# Patient Record
Sex: MALE | Race: BLACK OR AFRICAN AMERICAN | NOT HISPANIC OR LATINO | Employment: OTHER | URBAN - METROPOLITAN AREA
[De-identification: names, ages, dates, MRNs, and addresses within clinical notes are randomized per-mention and may not be internally consistent; named-entity substitution may affect disease eponyms.]

---

## 2024-01-04 ENCOUNTER — HOSPITAL ENCOUNTER (INPATIENT)
Facility: OTHER | Age: 59
LOS: 6 days | Discharge: HOME | DRG: 482 | End: 2024-01-10
Attending: EMERGENCY MEDICINE | Admitting: HOSPITALIST
Payer: MEDICAID

## 2024-01-04 DIAGNOSIS — S72.002A CLOSED FRACTURE OF NECK OF LEFT FEMUR, INITIAL ENCOUNTER: ICD-10-CM

## 2024-01-04 DIAGNOSIS — B20 HUMAN IMMUNODEFICIENCY VIRUS (HIV) DISEASE: Primary | ICD-10-CM

## 2024-01-04 DIAGNOSIS — R07.9 CHEST PAIN: ICD-10-CM

## 2024-01-04 DIAGNOSIS — M25.559 HIP PAIN: ICD-10-CM

## 2024-01-04 PROBLEM — M85.859 OSTEOPENIA OF HIP: Status: ACTIVE | Noted: 2023-11-15

## 2024-01-04 PROBLEM — E78.5 HLD (HYPERLIPIDEMIA): Status: ACTIVE | Noted: 2017-12-19

## 2024-01-04 PROBLEM — Z22.7 LTBI (LATENT TUBERCULOSIS INFECTION): Status: ACTIVE | Noted: 2023-07-25

## 2024-01-04 PROBLEM — K70.30 ALCOHOLIC CIRRHOSIS OF LIVER WITHOUT ASCITES: Status: ACTIVE | Noted: 2021-01-29

## 2024-01-04 LAB
ALBUMIN SERPL BCP-MCNC: 3.1 G/DL (ref 3.5–5.2)
ALP SERPL-CCNC: 84 U/L (ref 55–135)
ALT SERPL W/O P-5'-P-CCNC: 34 U/L (ref 10–44)
ANION GAP SERPL CALC-SCNC: 13 MMOL/L (ref 8–16)
AST SERPL-CCNC: 99 U/L (ref 10–40)
BASOPHILS # BLD AUTO: 0.08 K/UL (ref 0–0.2)
BASOPHILS NFR BLD: 0.8 % (ref 0–1.9)
BILIRUB SERPL-MCNC: 0.4 MG/DL (ref 0.1–1)
BUN SERPL-MCNC: 5 MG/DL (ref 6–20)
CALCIUM SERPL-MCNC: 9.1 MG/DL (ref 8.7–10.5)
CHLORIDE SERPL-SCNC: 104 MMOL/L (ref 95–110)
CO2 SERPL-SCNC: 18 MMOL/L (ref 23–29)
CREAT SERPL-MCNC: 0.6 MG/DL (ref 0.5–1.4)
DIFFERENTIAL METHOD BLD: ABNORMAL
EOSINOPHIL # BLD AUTO: 0 K/UL (ref 0–0.5)
EOSINOPHIL NFR BLD: 0.1 % (ref 0–8)
ERYTHROCYTE [DISTWIDTH] IN BLOOD BY AUTOMATED COUNT: 15.5 % (ref 11.5–14.5)
EST. GFR  (NO RACE VARIABLE): >60 ML/MIN/1.73 M^2
ETHANOL SERPL-MCNC: 147 MG/DL
GLUCOSE SERPL-MCNC: 91 MG/DL (ref 70–110)
HCT VFR BLD AUTO: 33.9 % (ref 40–54)
HGB BLD-MCNC: 11.2 G/DL (ref 14–18)
IMM GRANULOCYTES # BLD AUTO: 0.03 K/UL (ref 0–0.04)
IMM GRANULOCYTES NFR BLD AUTO: 0.3 % (ref 0–0.5)
INR PPP: 1.1 (ref 0.8–1.2)
LYMPHOCYTES # BLD AUTO: 2 K/UL (ref 1–4.8)
LYMPHOCYTES NFR BLD: 20.6 % (ref 18–48)
MCH RBC QN AUTO: 32.6 PG (ref 27–31)
MCHC RBC AUTO-ENTMCNC: 33 G/DL (ref 32–36)
MCV RBC AUTO: 99 FL (ref 82–98)
MONOCYTES # BLD AUTO: 1.5 K/UL (ref 0.3–1)
MONOCYTES NFR BLD: 15.3 % (ref 4–15)
NEUTROPHILS # BLD AUTO: 6.1 K/UL (ref 1.8–7.7)
NEUTROPHILS NFR BLD: 62.9 % (ref 38–73)
NRBC BLD-RTO: 0 /100 WBC
PLATELET # BLD AUTO: 151 K/UL (ref 150–450)
PMV BLD AUTO: 10 FL (ref 9.2–12.9)
POTASSIUM SERPL-SCNC: 3.6 MMOL/L (ref 3.5–5.1)
PROT SERPL-MCNC: 10.1 G/DL (ref 6–8.4)
PROTHROMBIN TIME: 12.1 SEC (ref 9–12.5)
RBC # BLD AUTO: 3.44 M/UL (ref 4.6–6.2)
SODIUM SERPL-SCNC: 135 MMOL/L (ref 136–145)
WBC # BLD AUTO: 9.69 K/UL (ref 3.9–12.7)

## 2024-01-04 PROCEDURE — 82077 ASSAY SPEC XCP UR&BREATH IA: CPT | Performed by: PHYSICIAN ASSISTANT

## 2024-01-04 PROCEDURE — 63600175 PHARM REV CODE 636 W HCPCS: Performed by: EMERGENCY MEDICINE

## 2024-01-04 PROCEDURE — 85610 PROTHROMBIN TIME: CPT | Performed by: PHYSICIAN ASSISTANT

## 2024-01-04 PROCEDURE — 25000003 PHARM REV CODE 250: Performed by: ORTHOPAEDIC SURGERY

## 2024-01-04 PROCEDURE — 25000003 PHARM REV CODE 250: Performed by: PHYSICIAN ASSISTANT

## 2024-01-04 PROCEDURE — G0378 HOSPITAL OBSERVATION PER HR: HCPCS

## 2024-01-04 PROCEDURE — 99285 EMERGENCY DEPT VISIT HI MDM: CPT

## 2024-01-04 PROCEDURE — 80053 COMPREHEN METABOLIC PANEL: CPT | Performed by: EMERGENCY MEDICINE

## 2024-01-04 PROCEDURE — 85025 COMPLETE CBC W/AUTO DIFF WBC: CPT | Performed by: EMERGENCY MEDICINE

## 2024-01-04 PROCEDURE — 12000002 HC ACUTE/MED SURGE SEMI-PRIVATE ROOM

## 2024-01-04 RX ORDER — KETOCONAZOLE 20 MG/G
2 CREAM TOPICAL DAILY
COMMUNITY
Start: 2023-07-19

## 2024-01-04 RX ORDER — ATORVASTATIN CALCIUM 20 MG/1
40 TABLET, FILM COATED ORAL NIGHTLY
Status: DISCONTINUED | OUTPATIENT
Start: 2024-01-04 | End: 2024-01-10 | Stop reason: HOSPADM

## 2024-01-04 RX ORDER — FOLIC ACID 1 MG/1
1 TABLET ORAL DAILY
Status: DISCONTINUED | OUTPATIENT
Start: 2024-01-05 | End: 2024-01-10 | Stop reason: HOSPADM

## 2024-01-04 RX ORDER — IPRATROPIUM BROMIDE AND ALBUTEROL SULFATE 2.5; .5 MG/3ML; MG/3ML
3 SOLUTION RESPIRATORY (INHALATION) EVERY 4 HOURS PRN
Status: DISCONTINUED | OUTPATIENT
Start: 2024-01-04 | End: 2024-01-10 | Stop reason: HOSPADM

## 2024-01-04 RX ORDER — TALC
6 POWDER (GRAM) TOPICAL NIGHTLY PRN
Status: DISCONTINUED | OUTPATIENT
Start: 2024-01-04 | End: 2024-01-10 | Stop reason: HOSPADM

## 2024-01-04 RX ORDER — ATORVASTATIN CALCIUM 40 MG/1
40 TABLET, FILM COATED ORAL
COMMUNITY

## 2024-01-04 RX ORDER — KETOROLAC TROMETHAMINE 30 MG/ML
10 INJECTION, SOLUTION INTRAMUSCULAR; INTRAVENOUS
Status: COMPLETED | OUTPATIENT
Start: 2024-01-04 | End: 2024-01-04

## 2024-01-04 RX ORDER — ACETAMINOPHEN 500 MG
1000 TABLET ORAL EVERY 8 HOURS PRN
Status: DISCONTINUED | OUTPATIENT
Start: 2024-01-04 | End: 2024-01-10 | Stop reason: HOSPADM

## 2024-01-04 RX ORDER — IBUPROFEN 200 MG
24 TABLET ORAL
Status: DISCONTINUED | OUTPATIENT
Start: 2024-01-04 | End: 2024-01-10 | Stop reason: HOSPADM

## 2024-01-04 RX ORDER — ACETAMINOPHEN 325 MG/1
650 TABLET ORAL EVERY 4 HOURS PRN
Status: DISCONTINUED | OUTPATIENT
Start: 2024-01-04 | End: 2024-01-10 | Stop reason: HOSPADM

## 2024-01-04 RX ORDER — POLYETHYLENE GLYCOL 3350 17 G/17G
17 POWDER, FOR SOLUTION ORAL DAILY
Status: DISCONTINUED | OUTPATIENT
Start: 2024-01-05 | End: 2024-01-10 | Stop reason: HOSPADM

## 2024-01-04 RX ORDER — LANOLIN ALCOHOL/MO/W.PET/CERES
100 CREAM (GRAM) TOPICAL DAILY
Status: DISCONTINUED | OUTPATIENT
Start: 2024-01-05 | End: 2024-01-10 | Stop reason: HOSPADM

## 2024-01-04 RX ORDER — LANOLIN ALCOHOL/MO/W.PET/CERES
1 CREAM (GRAM) TOPICAL 2 TIMES DAILY
COMMUNITY

## 2024-01-04 RX ORDER — SIMETHICONE 80 MG
1 TABLET,CHEWABLE ORAL 4 TIMES DAILY PRN
Status: DISCONTINUED | OUTPATIENT
Start: 2024-01-04 | End: 2024-01-10 | Stop reason: HOSPADM

## 2024-01-04 RX ORDER — AMLODIPINE BESYLATE 5 MG/1
10 TABLET ORAL DAILY
Status: DISCONTINUED | OUTPATIENT
Start: 2024-01-05 | End: 2024-01-10 | Stop reason: HOSPADM

## 2024-01-04 RX ORDER — IBUPROFEN 200 MG
16 TABLET ORAL
Status: DISCONTINUED | OUTPATIENT
Start: 2024-01-04 | End: 2024-01-10 | Stop reason: HOSPADM

## 2024-01-04 RX ORDER — AMLODIPINE BESYLATE 10 MG/1
10 TABLET ORAL DAILY
COMMUNITY

## 2024-01-04 RX ORDER — HYDROCODONE BITARTRATE AND ACETAMINOPHEN 5; 325 MG/1; MG/1
1 TABLET ORAL EVERY 6 HOURS PRN
Status: DISCONTINUED | OUTPATIENT
Start: 2024-01-04 | End: 2024-01-05

## 2024-01-04 RX ORDER — DIAZEPAM 5 MG/1
5 TABLET ORAL EVERY 4 HOURS PRN
Status: DISCONTINUED | OUTPATIENT
Start: 2024-01-04 | End: 2024-01-10 | Stop reason: HOSPADM

## 2024-01-04 RX ORDER — NALOXONE HCL 0.4 MG/ML
0.02 VIAL (ML) INJECTION
Status: DISCONTINUED | OUTPATIENT
Start: 2024-01-04 | End: 2024-01-10 | Stop reason: HOSPADM

## 2024-01-04 RX ORDER — FACIAL-BODY WIPES
10 EACH TOPICAL DAILY PRN
Status: DISCONTINUED | OUTPATIENT
Start: 2024-01-04 | End: 2024-01-10 | Stop reason: HOSPADM

## 2024-01-04 RX ORDER — MAG HYDROX/ALUMINUM HYD/SIMETH 200-200-20
30 SUSPENSION, ORAL (FINAL DOSE FORM) ORAL 4 TIMES DAILY PRN
Status: DISCONTINUED | OUTPATIENT
Start: 2024-01-04 | End: 2024-01-10 | Stop reason: HOSPADM

## 2024-01-04 RX ORDER — SODIUM CHLORIDE 0.9 % (FLUSH) 0.9 %
5 SYRINGE (ML) INJECTION
Status: DISCONTINUED | OUTPATIENT
Start: 2024-01-04 | End: 2024-01-10 | Stop reason: HOSPADM

## 2024-01-04 RX ORDER — EMTRICITABINE, RILPIVIRINE HYDROCHLORIDE, AND TENOFOVIR ALAFENAMIDE 200; 25; 25 MG/1; MG/1; MG/1
1 TABLET ORAL EVERY MORNING
COMMUNITY

## 2024-01-04 RX ORDER — PROCHLORPERAZINE EDISYLATE 5 MG/ML
5 INJECTION INTRAMUSCULAR; INTRAVENOUS EVERY 6 HOURS PRN
Status: DISCONTINUED | OUTPATIENT
Start: 2024-01-04 | End: 2024-01-10 | Stop reason: HOSPADM

## 2024-01-04 RX ORDER — ONDANSETRON 8 MG/1
8 TABLET, ORALLY DISINTEGRATING ORAL EVERY 8 HOURS PRN
Status: DISCONTINUED | OUTPATIENT
Start: 2024-01-04 | End: 2024-01-10 | Stop reason: HOSPADM

## 2024-01-04 RX ORDER — GLUCAGON 1 MG
1 KIT INJECTION
Status: DISCONTINUED | OUTPATIENT
Start: 2024-01-04 | End: 2024-01-10 | Stop reason: HOSPADM

## 2024-01-04 RX ADMIN — HYDROCODONE BITARTRATE AND ACETAMINOPHEN 1 TABLET: 5; 325 TABLET ORAL at 08:01

## 2024-01-04 RX ADMIN — KETOROLAC TROMETHAMINE 10 MG: 30 INJECTION, SOLUTION INTRAMUSCULAR; INTRAVENOUS at 05:01

## 2024-01-04 RX ADMIN — ATORVASTATIN CALCIUM 40 MG: 20 TABLET, FILM COATED ORAL at 08:01

## 2024-01-04 NOTE — FIRST PROVIDER EVALUATION
Emergency Department TeleTriage Encounter Note      CHIEF COMPLAINT    Chief Complaint   Patient presents with    Fall     Reports fall 30 mins ago, complaining of severe L hip pain. Unable to walk in triage. States he does not know how he fell. + ETOH. Denies hitting head, denies syncope.       VITAL SIGNS   Initial Vitals [01/04/24 1540]   BP Pulse Resp Temp SpO2   (!) 146/84 81 20 -- 99 %      MAP       --            ALLERGIES    Review of patient's allergies indicates:  No Known Allergies    PROVIDER TRIAGE NOTE  Patient presents with complaint of left hip pain after fall.  Denies hitting his head.      Phy:   Constitutional: well nourished, well developed, appearing stated age, NAD        Initial orders will be placed and care will be transferred to an alternate provider when patient is roomed for a full evaluation. Any additional orders and the final disposition will be determined by that provider.        ORDERS  Labs Reviewed - No data to display    ED Orders (720h ago, onward)      None              Virtual Visit Note: The provider triage portion of this emergency department evaluation and documentation was performed via [employer], a HIPAA-compliant telemedicine application, in concert with a tele-presenter in the room. A face to face patient evaluation with one of my colleagues will occur once the patient is placed in an emergency department room.      DISCLAIMER: This note was prepared with [employer] voice recognition transcription software. Garbled syntax, mangled pronouns, and other bizarre constructions may be attributed to that software system.

## 2024-01-04 NOTE — ED TRIAGE NOTES
Patient presents to ED with c/o L hip pain after a fall while walking. He reports feeling his hip give out. Denies head injury. Reports decreased ROM and mobility due to pain.

## 2024-01-04 NOTE — ASSESSMENT & PLAN NOTE
Left hip xray shows acute subcapital versus transcervical left femoral neck fracture  Orthopedics consulted in ER  Will keep NPO @ MN and hold pharmacological VTE prophylaxis in anticipation for surgery tomorrow AM  Pain control with tylenol, norco  PTOT to be consulted after Ortho eval

## 2024-01-04 NOTE — ED PROVIDER NOTES
Encounter Date: 1/4/2024    SCRIBE #1 NOTE: IDario, am scribing for, and in the presence of,  Chacho Chairez II, MD. I have scribed the following portions of the note - Other sections scribed: HPI, ROS, PE.       History     Chief Complaint   Patient presents with    Fall     Reports fall 30 mins ago, complaining of severe L hip pain. Unable to walk in triage. States he does not know how he fell. + ETOH. Denies hitting head, denies syncope.     Time seen by provider: 4:25 PM    This is a 58 y.o. male who presents with complaint of a left hip injury 5-10 minutes ago. Patient was walking down the sidewalk when his leg suddenly gave out, causing him to fall onto the concrete without resulting head injury or LOC. When EMS arrived, he realized he was unable to move his leg. PMHx of HIV with good medication compliance and recent CD4 count within normal limits. SHx of daily tobacco smoking as well as occasional alcohol and marijuana use, however no further illicit drug use. This is the extent of the patient's complaints at this time.    The history is provided by the patient.     Review of patient's allergies indicates:   Allergen Reactions    Lisinopril Other (See Comments)     Angioedema^    Angioedema^   Angioedema^     History reviewed. No pertinent past medical history.  History reviewed. No pertinent surgical history.  History reviewed. No pertinent family history.  Social History     Tobacco Use    Smoking status: Every Day     Current packs/day: 0.50     Types: Cigarettes    Smokeless tobacco: Never   Substance Use Topics    Alcohol use: Not Currently    Drug use: Yes     Types: Marijuana     Review of Systems  See HPI     Physical Exam     Initial Vitals [01/04/24 1540]   BP Pulse Resp Temp SpO2   (!) 146/84 81 20 -- 99 %      MAP       --         Physical Exam    Nursing note and vitals reviewed.  Constitutional: He appears well-developed and well-nourished.   Odor of alcohol.    HENT:   Head:  Normocephalic.   No craniofacial trauma.    Eyes: Conjunctivae are normal.   Neck: Neck supple.   Musculoskeletal:         General: No edema.      Cervical back: Neck supple.      Comments: Tenderness over left lateral hip. Pain limits ROM. Unable to bear weight. Intact distal strength and sensation. Chronic 1+ pitting edema to the bilateral lower extremities.     Neurological: He is alert and oriented to person, place, and time.   Skin: Skin is warm and dry.   Psychiatric: He has a normal mood and affect.         ED Course   Procedures  Labs Reviewed   CBC W/ AUTO DIFFERENTIAL - Abnormal; Notable for the following components:       Result Value    RBC 3.44 (*)     Hemoglobin 11.2 (*)     Hematocrit 33.9 (*)     MCV 99 (*)     MCH 32.6 (*)     RDW 15.5 (*)     Mono # 1.5 (*)     Mono % 15.3 (*)     All other components within normal limits   COMPREHENSIVE METABOLIC PANEL - Abnormal; Notable for the following components:    Sodium 135 (*)     CO2 18 (*)     BUN 5 (*)     Total Protein 10.1 (*)     Albumin 3.1 (*)     AST 99 (*)     All other components within normal limits   PROTIME-INR   TOXICOLOGY SCREEN, URINE, RANDOM (COMPLIANCE)   ALCOHOL,MEDICAL (ETHANOL)          Imaging Results              X-Ray Hip 2 or 3 views Left (with Pelvis when performed) (Final result)  Result time 01/04/24 16:28:51      Final result by Major Rodriguez MD (01/04/24 16:28:51)                   Impression:      Acute subcapital versus transcervical left femoral neck fracture.      Electronically signed by: Major Rodriguez MD  Date:    01/04/2024  Time:    16:28               Narrative:    EXAMINATION:  XR HIP WITH PELVIS WHEN PERFORMED, 2 OR 3 VIEWS LEFT    CLINICAL HISTORY:  Pain in unspecified hip    TECHNIQUE:  AP view of the pelvis and frog leg lateral view of the left hip were performed.    COMPARISON:  None    FINDINGS:  There is a linear bony lucency at the subcapital left femoral neck most consistent with an acute left femoral  neck fracture.  The remainder of the bones appear intact.  There is no evidence for dislocation.  Sacroiliac joints are unremarkable.  Soft tissues appear grossly unremarkable.                                    X-Rays:   Independently Interpreted Readings:   Other Readings:  On x-ray of the left hip: nondisplaced left femoral neck.    Medications   amLODIPine tablet 10 mg (has no administration in time range)   atorvastatin tablet 40 mg (has no administration in time range)   sodium chloride 0.9% flush 5 mL (has no administration in time range)   albuterol-ipratropium 2.5 mg-0.5 mg/3 mL nebulizer solution 3 mL (has no administration in time range)   melatonin tablet 6 mg (has no administration in time range)   ondansetron disintegrating tablet 8 mg (has no administration in time range)   prochlorperazine injection Soln 5 mg (has no administration in time range)   polyethylene glycol packet 17 g (has no administration in time range)   bisacodyL suppository 10 mg (has no administration in time range)   simethicone chewable tablet 80 mg (has no administration in time range)   aluminum-magnesium hydroxide-simethicone 200-200-20 mg/5 mL suspension 30 mL (has no administration in time range)   acetaminophen tablet 650 mg (has no administration in time range)   acetaminophen tablet 1,000 mg (has no administration in time range)   naloxone 0.4 mg/mL injection 0.02 mg (has no administration in time range)   glucose chewable tablet 16 g (has no administration in time range)   glucose chewable tablet 24 g (has no administration in time range)   glucagon (human recombinant) injection 1 mg (has no administration in time range)   dextrose 10% bolus 125 mL 125 mL (has no administration in time range)   dextrose 10% bolus 250 mL 250 mL (has no administration in time range)   diazePAM tablet 5 mg (has no administration in time range)   multivitamin tablet (has no administration in time range)   folic acid tablet 1 mg (has no  administration in time range)   thiamine tablet 100 mg (has no administration in time range)   emtricitabine-tenofovir alafen 200-25 mg Tab 1 tablet (has no administration in time range)   rilpivirine HCl Tab 25 mg (has no administration in time range)   HYDROcodone-acetaminophen 5-325 mg per tablet 1 tablet (has no administration in time range)   ketorolac injection 9.999 mg (9.999 mg Intravenous Given 1/4/24 8084)     Medical Decision Making  Amount and/or Complexity of Data Reviewed  External Data Reviewed: notes.  Labs: ordered.  Radiology: ordered and independent interpretation performed.     Details: See interpretation above.     Risk  Prescription drug management.  Decision regarding hospitalization.    Patient presents after ground level fall with left hip pain, inability to bear weight.  X-ray does show nondisplaced fracture at the neck.  Consulted on-call orthopedics, Dr. Carpenter.  Will admit to the hospitalist service.  No other injuries or acute concerns.        Scribe Attestation:   Scribe #1: I performed the above scribed service and the documentation accurately describes the services I performed. I attest to the accuracy of the note.    Physician Attestation for Scribe: I, [facility], reviewed documentation as scribed in my presence, which is both accurate and complete.        ED Course as of 01/04/24 1827   Thu Jan 04, 2024 1656 Discussed case with orthopedics. Agrees with plan and will consult. [GS]      ED Course User Index  [GS] Dario Abarca                           Clinical Impression:  Final diagnoses:  [M25.559] Hip pain          ED Disposition Condition    Observation                 Chacho Chairez II, MD  01/04/24 6419

## 2024-01-04 NOTE — ASSESSMENT & PLAN NOTE
Drinks about 24 beers per day, last drink afternoon of admission  UDS and EtOH pending  Start folic acid, thiamine, MVI  CIWA q8H, seizure precautions  Valium PRN  Denies history of alcohol withdrawals

## 2024-01-04 NOTE — PHARMACY MED REC
"Admission Medication History     The home medication history was taken by Emili Arellano CPHT      You may go to "Admission" then "Reconcile Home Medications" tabs to review and/or act upon these items.     Patient was able to verbally verify medication and compliance.     "

## 2024-01-04 NOTE — ASSESSMENT & PLAN NOTE
No recent CD4 count, will add on for AM labs  Continue home lfoefeybye-fhumenue-wtqpzv ala 200-25-25 mg Tab 1 tablet daily (not on formulary, will have to order separately or have someone bring in meds from home)

## 2024-01-05 ENCOUNTER — ANESTHESIA EVENT (OUTPATIENT)
Dept: SURGERY | Facility: OTHER | Age: 59
DRG: 482 | End: 2024-01-05
Payer: MEDICAID

## 2024-01-05 ENCOUNTER — ANESTHESIA (OUTPATIENT)
Dept: SURGERY | Facility: OTHER | Age: 59
DRG: 482 | End: 2024-01-05
Payer: MEDICAID

## 2024-01-05 LAB
AMPHET+METHAMPHET UR QL: NEGATIVE
BARBITURATES UR QL SCN>200 NG/ML: NEGATIVE
BENZODIAZ UR QL SCN>200 NG/ML: NEGATIVE
BZE UR QL SCN: NEGATIVE
CANNABINOIDS UR QL SCN: ABNORMAL
CREAT UR-MCNC: 102.7 MG/DL (ref 23–375)
ETHANOL UR-MCNC: 78 MG/DL
METHADONE UR QL SCN>300 NG/ML: NEGATIVE
OPIATES UR QL SCN: ABNORMAL
PCP UR QL SCN>25 NG/ML: NEGATIVE
TOXICOLOGY INFORMATION: ABNORMAL

## 2024-01-05 PROCEDURE — 64450 NJX AA&/STRD OTHER PN/BRANCH: CPT | Mod: 59,LT,, | Performed by: ANESTHESIOLOGY

## 2024-01-05 PROCEDURE — 25000003 PHARM REV CODE 250: Performed by: NURSE ANESTHETIST, CERTIFIED REGISTERED

## 2024-01-05 PROCEDURE — 36000711: Performed by: ORTHOPAEDIC SURGERY

## 2024-01-05 PROCEDURE — 25000003 PHARM REV CODE 250: Performed by: HOSPITALIST

## 2024-01-05 PROCEDURE — 25000003 PHARM REV CODE 250: Performed by: ORTHOPAEDIC SURGERY

## 2024-01-05 PROCEDURE — 63600175 PHARM REV CODE 636 W HCPCS: Performed by: ANESTHESIOLOGY

## 2024-01-05 PROCEDURE — D9220A PRA ANESTHESIA: Mod: CRNA,,, | Performed by: NURSE ANESTHETIST, CERTIFIED REGISTERED

## 2024-01-05 PROCEDURE — 71000039 HC RECOVERY, EACH ADD'L HOUR: Performed by: ORTHOPAEDIC SURGERY

## 2024-01-05 PROCEDURE — 63600175 PHARM REV CODE 636 W HCPCS

## 2024-01-05 PROCEDURE — C1713 ANCHOR/SCREW BN/BN,TIS/BN: HCPCS | Performed by: ORTHOPAEDIC SURGERY

## 2024-01-05 PROCEDURE — 87536 HIV-1 QUANT&REVRSE TRNSCRPJ: CPT | Performed by: PHYSICIAN ASSISTANT

## 2024-01-05 PROCEDURE — 71000033 HC RECOVERY, INTIAL HOUR: Performed by: ORTHOPAEDIC SURGERY

## 2024-01-05 PROCEDURE — 11000001 HC ACUTE MED/SURG PRIVATE ROOM

## 2024-01-05 PROCEDURE — 37000008 HC ANESTHESIA 1ST 15 MINUTES: Performed by: ORTHOPAEDIC SURGERY

## 2024-01-05 PROCEDURE — 63600175 PHARM REV CODE 636 W HCPCS: Performed by: NURSE ANESTHETIST, CERTIFIED REGISTERED

## 2024-01-05 PROCEDURE — 63600175 PHARM REV CODE 636 W HCPCS: Performed by: NURSE PRACTITIONER

## 2024-01-05 PROCEDURE — 25000003 PHARM REV CODE 250: Performed by: PHYSICIAN ASSISTANT

## 2024-01-05 PROCEDURE — 86361 T CELL ABSOLUTE COUNT: CPT | Performed by: PHYSICIAN ASSISTANT

## 2024-01-05 PROCEDURE — 80307 DRUG TEST PRSMV CHEM ANLYZR: CPT | Performed by: PHYSICIAN ASSISTANT

## 2024-01-05 PROCEDURE — P9045 ALBUMIN (HUMAN), 5%, 250 ML: HCPCS | Mod: JZ,JG | Performed by: NURSE ANESTHETIST, CERTIFIED REGISTERED

## 2024-01-05 PROCEDURE — 36415 COLL VENOUS BLD VENIPUNCTURE: CPT | Performed by: PHYSICIAN ASSISTANT

## 2024-01-05 PROCEDURE — 37000009 HC ANESTHESIA EA ADD 15 MINS: Performed by: ORTHOPAEDIC SURGERY

## 2024-01-05 PROCEDURE — D9220A PRA ANESTHESIA: Mod: ANES,,, | Performed by: ANESTHESIOLOGY

## 2024-01-05 PROCEDURE — 64450 NJX AA&/STRD OTHER PN/BRANCH: CPT | Performed by: ANESTHESIOLOGY

## 2024-01-05 PROCEDURE — 36000710: Performed by: ORTHOPAEDIC SURGERY

## 2024-01-05 PROCEDURE — 63600175 PHARM REV CODE 636 W HCPCS: Performed by: ORTHOPAEDIC SURGERY

## 2024-01-05 PROCEDURE — 76942 ECHO GUIDE FOR BIOPSY: CPT | Mod: 26,,, | Performed by: ANESTHESIOLOGY

## 2024-01-05 PROCEDURE — 0QS734Z REPOSITION LEFT UPPER FEMUR WITH INTERNAL FIXATION DEVICE, PERCUTANEOUS APPROACH: ICD-10-PCS | Performed by: ORTHOPAEDIC SURGERY

## 2024-01-05 PROCEDURE — 25000003 PHARM REV CODE 250: Performed by: ANESTHESIOLOGY

## 2024-01-05 PROCEDURE — 94761 N-INVAS EAR/PLS OXIMETRY MLT: CPT

## 2024-01-05 PROCEDURE — 27201423 OPTIME MED/SURG SUP & DEVICES STERILE SUPPLY: Performed by: ORTHOPAEDIC SURGERY

## 2024-01-05 PROCEDURE — C1769 GUIDE WIRE: HCPCS | Performed by: ORTHOPAEDIC SURGERY

## 2024-01-05 DEVICE — IMPLANTABLE DEVICE: Type: IMPLANTABLE DEVICE | Site: HIP | Status: FUNCTIONAL

## 2024-01-05 DEVICE — PLATE FNS 1 HOLE TI STRL: Type: IMPLANTABLE DEVICE | Site: HIP | Status: FUNCTIONAL

## 2024-01-05 RX ORDER — OXYCODONE HYDROCHLORIDE 5 MG/1
5 TABLET ORAL
Status: DISCONTINUED | OUTPATIENT
Start: 2024-01-05 | End: 2024-01-05

## 2024-01-05 RX ORDER — DEXAMETHASONE SODIUM PHOSPHATE 4 MG/ML
INJECTION, SOLUTION INTRA-ARTICULAR; INTRALESIONAL; INTRAMUSCULAR; INTRAVENOUS; SOFT TISSUE
Status: DISCONTINUED | OUTPATIENT
Start: 2024-01-05 | End: 2024-01-05

## 2024-01-05 RX ORDER — LIDOCAINE HYDROCHLORIDE 20 MG/ML
INJECTION INTRAVENOUS
Status: DISCONTINUED | OUTPATIENT
Start: 2024-01-05 | End: 2024-01-05

## 2024-01-05 RX ORDER — DIPHENHYDRAMINE HYDROCHLORIDE 50 MG/ML
25 INJECTION, SOLUTION INTRAMUSCULAR; INTRAVENOUS EVERY 6 HOURS PRN
Status: DISCONTINUED | OUTPATIENT
Start: 2024-01-05 | End: 2024-01-05 | Stop reason: HOSPADM

## 2024-01-05 RX ORDER — TRAMADOL HYDROCHLORIDE 50 MG/1
50 TABLET ORAL EVERY 4 HOURS PRN
Status: DISCONTINUED | OUTPATIENT
Start: 2024-01-05 | End: 2024-01-10 | Stop reason: HOSPADM

## 2024-01-05 RX ORDER — ROCURONIUM BROMIDE 10 MG/ML
INJECTION, SOLUTION INTRAVENOUS
Status: DISCONTINUED | OUTPATIENT
Start: 2024-01-05 | End: 2024-01-05

## 2024-01-05 RX ORDER — FENTANYL CITRATE 50 UG/ML
INJECTION, SOLUTION INTRAMUSCULAR; INTRAVENOUS
Status: DISCONTINUED | OUTPATIENT
Start: 2024-01-05 | End: 2024-01-05

## 2024-01-05 RX ORDER — PHENYLEPHRINE HYDROCHLORIDE 10 MG/ML
INJECTION INTRAVENOUS
Status: DISCONTINUED | OUTPATIENT
Start: 2024-01-05 | End: 2024-01-05

## 2024-01-05 RX ORDER — ROPIVACAINE HYDROCHLORIDE 5 MG/ML
INJECTION, SOLUTION EPIDURAL; INFILTRATION; PERINEURAL
Status: COMPLETED | OUTPATIENT
Start: 2024-01-05 | End: 2024-01-05

## 2024-01-05 RX ORDER — HYDROCODONE BITARTRATE AND ACETAMINOPHEN 5; 325 MG/1; MG/1
1 TABLET ORAL EVERY 6 HOURS PRN
Status: DISCONTINUED | OUTPATIENT
Start: 2024-01-05 | End: 2024-01-05

## 2024-01-05 RX ORDER — HYDROMORPHONE HYDROCHLORIDE 2 MG/ML
0.4 INJECTION, SOLUTION INTRAMUSCULAR; INTRAVENOUS; SUBCUTANEOUS EVERY 5 MIN PRN
Status: DISCONTINUED | OUTPATIENT
Start: 2024-01-05 | End: 2024-01-05 | Stop reason: HOSPADM

## 2024-01-05 RX ORDER — CEFAZOLIN 2 G/1
INJECTION, POWDER, FOR SOLUTION INTRAMUSCULAR; INTRAVENOUS
Status: DISCONTINUED | OUTPATIENT
Start: 2024-01-05 | End: 2024-01-05

## 2024-01-05 RX ORDER — PROCHLORPERAZINE EDISYLATE 5 MG/ML
5 INJECTION INTRAMUSCULAR; INTRAVENOUS EVERY 30 MIN PRN
Status: DISCONTINUED | OUTPATIENT
Start: 2024-01-05 | End: 2024-01-05

## 2024-01-05 RX ORDER — HYDROCODONE BITARTRATE AND ACETAMINOPHEN 5; 325 MG/1; MG/1
1 TABLET ORAL EVERY 4 HOURS PRN
Status: DISCONTINUED | OUTPATIENT
Start: 2024-01-05 | End: 2024-01-10 | Stop reason: HOSPADM

## 2024-01-05 RX ORDER — MEPERIDINE HYDROCHLORIDE 25 MG/ML
12.5 INJECTION INTRAMUSCULAR; INTRAVENOUS; SUBCUTANEOUS ONCE AS NEEDED
Status: DISCONTINUED | OUTPATIENT
Start: 2024-01-05 | End: 2024-01-05

## 2024-01-05 RX ORDER — PROCHLORPERAZINE EDISYLATE 5 MG/ML
5 INJECTION INTRAMUSCULAR; INTRAVENOUS EVERY 30 MIN PRN
Status: DISCONTINUED | OUTPATIENT
Start: 2024-01-05 | End: 2024-01-05 | Stop reason: HOSPADM

## 2024-01-05 RX ORDER — HYDROCODONE BITARTRATE AND ACETAMINOPHEN 10; 325 MG/1; MG/1
1 TABLET ORAL EVERY 4 HOURS PRN
Status: DISCONTINUED | OUTPATIENT
Start: 2024-01-05 | End: 2024-01-10 | Stop reason: HOSPADM

## 2024-01-05 RX ORDER — SODIUM CHLORIDE 0.9 % (FLUSH) 0.9 %
3 SYRINGE (ML) INJECTION
Status: DISCONTINUED | OUTPATIENT
Start: 2024-01-05 | End: 2024-01-05 | Stop reason: HOSPADM

## 2024-01-05 RX ORDER — SODIUM CHLORIDE 0.9 % (FLUSH) 0.9 %
3 SYRINGE (ML) INJECTION
Status: DISCONTINUED | OUTPATIENT
Start: 2024-01-05 | End: 2024-01-05

## 2024-01-05 RX ORDER — MEPERIDINE HYDROCHLORIDE 25 MG/ML
12.5 INJECTION INTRAMUSCULAR; INTRAVENOUS; SUBCUTANEOUS ONCE AS NEEDED
Status: DISCONTINUED | OUTPATIENT
Start: 2024-01-05 | End: 2024-01-05 | Stop reason: HOSPADM

## 2024-01-05 RX ORDER — HYDROMORPHONE HYDROCHLORIDE 1 MG/ML
1 INJECTION, SOLUTION INTRAMUSCULAR; INTRAVENOUS; SUBCUTANEOUS EVERY 6 HOURS PRN
Status: DISCONTINUED | OUTPATIENT
Start: 2024-01-05 | End: 2024-01-10 | Stop reason: HOSPADM

## 2024-01-05 RX ORDER — PROPOFOL 10 MG/ML
VIAL (ML) INTRAVENOUS
Status: DISCONTINUED | OUTPATIENT
Start: 2024-01-05 | End: 2024-01-05

## 2024-01-05 RX ORDER — MIDAZOLAM HYDROCHLORIDE 1 MG/ML
INJECTION INTRAMUSCULAR; INTRAVENOUS
Status: DISCONTINUED | OUTPATIENT
Start: 2024-01-05 | End: 2024-01-05

## 2024-01-05 RX ORDER — ALBUMIN HUMAN 50 G/1000ML
SOLUTION INTRAVENOUS
Status: DISCONTINUED | OUTPATIENT
Start: 2024-01-05 | End: 2024-01-05

## 2024-01-05 RX ORDER — ONDANSETRON 2 MG/ML
4 INJECTION INTRAMUSCULAR; INTRAVENOUS EVERY 12 HOURS PRN
Status: DISCONTINUED | OUTPATIENT
Start: 2024-01-05 | End: 2024-01-10 | Stop reason: HOSPADM

## 2024-01-05 RX ORDER — HYDROMORPHONE HYDROCHLORIDE 2 MG/ML
0.4 INJECTION, SOLUTION INTRAMUSCULAR; INTRAVENOUS; SUBCUTANEOUS EVERY 5 MIN PRN
Status: DISCONTINUED | OUTPATIENT
Start: 2024-01-05 | End: 2024-01-05

## 2024-01-05 RX ORDER — OXYCODONE HYDROCHLORIDE 5 MG/1
5 TABLET ORAL
Status: DISCONTINUED | OUTPATIENT
Start: 2024-01-05 | End: 2024-01-05 | Stop reason: HOSPADM

## 2024-01-05 RX ADMIN — FENTANYL CITRATE 100 MCG: 50 INJECTION, SOLUTION INTRAMUSCULAR; INTRAVENOUS at 12:01

## 2024-01-05 RX ADMIN — HYDROMORPHONE HYDROCHLORIDE 0.4 MG: 2 INJECTION INTRAMUSCULAR; INTRAVENOUS; SUBCUTANEOUS at 02:01

## 2024-01-05 RX ADMIN — PHENYLEPHRINE HYDROCHLORIDE 200 MCG: 10 INJECTION INTRAVENOUS at 01:01

## 2024-01-05 RX ADMIN — HYDROCODONE BITARTRATE AND ACETAMINOPHEN 1 TABLET: 5; 325 TABLET ORAL at 06:01

## 2024-01-05 RX ADMIN — ALBUMIN (HUMAN) 200 ML: 12.5 SOLUTION INTRAVENOUS at 02:01

## 2024-01-05 RX ADMIN — DEXAMETHASONE SODIUM PHOSPHATE 8 MG: 4 INJECTION, SOLUTION INTRAMUSCULAR; INTRAVENOUS at 01:01

## 2024-01-05 RX ADMIN — ALBUMIN (HUMAN) 200 ML: 12.5 SOLUTION INTRAVENOUS at 01:01

## 2024-01-05 RX ADMIN — LIDOCAINE HYDROCHLORIDE 100 MG: 20 INJECTION, SOLUTION INTRAVENOUS at 01:01

## 2024-01-05 RX ADMIN — SUGAMMADEX 200 MG: 100 INJECTION, SOLUTION INTRAVENOUS at 02:01

## 2024-01-05 RX ADMIN — ROPIVACAINE HYDROCHLORIDE 20 ML: 5 INJECTION, SOLUTION EPIDURAL; INFILTRATION; PERINEURAL at 12:01

## 2024-01-05 RX ADMIN — CEFAZOLIN 2 G: 2 INJECTION, POWDER, FOR SOLUTION INTRAMUSCULAR; INTRAVENOUS at 11:01

## 2024-01-05 RX ADMIN — OXYCODONE HYDROCHLORIDE 5 MG: 5 TABLET ORAL at 02:01

## 2024-01-05 RX ADMIN — CARBOXYMETHYLCELLULOSE SODIUM 2 DROP: 2.5 SOLUTION/ DROPS OPHTHALMIC at 01:01

## 2024-01-05 RX ADMIN — SODIUM CHLORIDE, SODIUM LACTATE, POTASSIUM CHLORIDE, AND CALCIUM CHLORIDE: .6; .31; .03; .02 INJECTION, SOLUTION INTRAVENOUS at 12:01

## 2024-01-05 RX ADMIN — CEFAZOLIN 2 G: 2 INJECTION, POWDER, FOR SOLUTION INTRAMUSCULAR; INTRAVENOUS at 01:01

## 2024-01-05 RX ADMIN — ROCURONIUM BROMIDE 50 MG: 10 INJECTION, SOLUTION INTRAVENOUS at 01:01

## 2024-01-05 RX ADMIN — ALBUMIN (HUMAN) 100 ML: 12.5 SOLUTION INTRAVENOUS at 01:01

## 2024-01-05 RX ADMIN — RILPIVIRINE HYDROCHLORIDE 25 MG: 25 TABLET, FILM COATED ORAL at 06:01

## 2024-01-05 RX ADMIN — HYDROCODONE BITARTRATE AND ACETAMINOPHEN 1 TABLET: 10; 325 TABLET ORAL at 08:01

## 2024-01-05 RX ADMIN — PROPOFOL 160 MG: 10 INJECTION, EMULSION INTRAVENOUS at 01:01

## 2024-01-05 RX ADMIN — ATORVASTATIN CALCIUM 40 MG: 20 TABLET, FILM COATED ORAL at 08:01

## 2024-01-05 RX ADMIN — HYDROMORPHONE HYDROCHLORIDE 1 MG: 1 INJECTION, SOLUTION INTRAMUSCULAR; INTRAVENOUS; SUBCUTANEOUS at 10:01

## 2024-01-05 RX ADMIN — MIDAZOLAM HYDROCHLORIDE 2 MG: 1 INJECTION, SOLUTION INTRAMUSCULAR; INTRAVENOUS at 12:01

## 2024-01-05 RX ADMIN — HYDROMORPHONE HYDROCHLORIDE 0.4 MG: 2 INJECTION INTRAMUSCULAR; INTRAVENOUS; SUBCUTANEOUS at 03:01

## 2024-01-05 RX ADMIN — EMTRICITABINE AND TENOFOVIR ALAFENAMIDE 1 TABLET: 200; 25 TABLET ORAL at 06:01

## 2024-01-05 NOTE — TRANSFER OF CARE
"Anesthesia Transfer of Care Note    Patient: Damien Sanchez    Procedure(s) Performed: Procedure(s) (LRB):  ORIF, HIP (Left)    Patient location: PACU    Anesthesia Type: general    Transport from OR: Transported from OR on 2-3 L/min O2 by NC with adequate spontaneous ventilation    Post pain: adequate analgesia    Post assessment: no apparent anesthetic complications    Post vital signs: stable    Level of consciousness: awake    Nausea/Vomiting: no nausea/vomiting    Complications: none    Transfer of care protocol was followed    Last vitals: Visit Vitals  /74   Pulse 92   Temp 36.8 °C (98.2 °F) (Skin)   Resp 18   Ht 5' 7" (1.702 m)   Wt 59 kg (130 lb)   SpO2 95%   BMI 20.36 kg/m²     "

## 2024-01-05 NOTE — HPI
Damien Sanchez is a 58M with HIV on odefsey, HTN and alcohol abuse who presents for hip pain sustained after a fall. Today he was walking to his friend's house to celebrate her birthday when he fell on his left side. Denies hitting his head or losing consciousness. Since the fall he was unable to walk because of left hip pain. He drinks daily, including drinking today. Usually he drinks about 24 beers per day, denies history of alcohol withdrawal.     In ER: hip xray shows left humeral fracture, ortho consulted, IV ketorolac ordered

## 2024-01-05 NOTE — ANESTHESIA PROCEDURE NOTES
Peripheral Block    Patient location during procedure: pre-op   Block not for primary anesthetic.  Reason for block: at surgeon's request and post-op pain management   Post-op Pain Location: left hip pain   Timeout: 1/5/2024 12:50 PM   End time: 1/5/2024 12:52 PM    Staffing  Authorizing Provider: Rajan Medina MD  Performing Provider: Rajan Medina MD    Staffing  Other anesthesia staff: Lawrence Fam Jr., MD  Performed by: Rajan Medina MD  Authorized by: Rajan Medina MD    Preanesthetic Checklist  Completed: patient identified, IV checked, site marked, risks and benefits discussed, surgical consent, monitors and equipment checked, pre-op evaluation and timeout performed  Peripheral Block  Patient position: supine  Prep: ChloraPrep  Patient monitoring: heart rate, continuous pulse ox and cardiac monitor  Block type: PENG  Laterality: left  Injection technique: single shot  Needle  Needle type: Stimuplex   Needle gauge: 21 G  Needle length: 4 in  Needle localization: ultrasound guidance   -ultrasound image captured on disc.  Assessment  Injection assessment: negative aspiration and negative parasthesia  Paresthesia pain: none  Heart rate change: no  Slow fractionated injection: yes  Pain Tolerance: comfortable throughout block and no complaints  Medications:    Medications: ropivacaine (NAROPIN) injection 0.5% - Perineural   20 mL - 1/5/2024 12:52:00 PM

## 2024-01-05 NOTE — SUBJECTIVE & OBJECTIVE
Interval History: Pain to left upper leg 10/10 with minimal movement. Sensation and pulses intacta and 2+ bilaterally. Sisters at bedside. Patient awaiting his orthopedic surgery.     Review of Systems   Constitutional:  Negative for activity change and diaphoresis.   HENT:  Negative for congestion.    Respiratory:  Negative for apnea and shortness of breath.    Cardiovascular:  Negative for chest pain.   Gastrointestinal:  Negative for abdominal distention.   Genitourinary:  Negative for decreased urine volume.   Musculoskeletal:  Positive for arthralgias.   Neurological:  Negative for dizziness, speech difficulty, light-headedness and numbness.     Objective:     Vital Signs (Most Recent):  Temp: 98.1 °F (36.7 °C) (01/05/24 1430)  Pulse: 88 (01/05/24 1645)  Resp: 18 (01/05/24 1645)  BP: 137/75 (01/05/24 1645)  SpO2: 96 % (01/05/24 1645) Vital Signs (24h Range):  Temp:  [98 °F (36.7 °C)-98.4 °F (36.9 °C)] 98.1 °F (36.7 °C)  Pulse:  [83-98] 88  Resp:  [16-18] 18  SpO2:  [95 %-99 %] 96 %  BP: (120-146)/(66-81) 137/75     Weight: 59 kg (130 lb)  Body mass index is 20.36 kg/m².    Intake/Output Summary (Last 24 hours) at 1/5/2024 1654  Last data filed at 1/5/2024 1403  Gross per 24 hour   Intake 1000 ml   Output 550 ml   Net 450 ml         Physical Exam  Vitals reviewed.   HENT:      Mouth/Throat:      Mouth: Mucous membranes are dry.   Cardiovascular:      Rate and Rhythm: Normal rate.      Heart sounds: Normal heart sounds.   Pulmonary:      Effort: Pulmonary effort is normal.      Breath sounds: Normal breath sounds. No wheezing, rhonchi or rales.   Musculoskeletal:         General: Tenderness (left upper thigh) present.      Right lower leg: No edema.      Left lower leg: No edema.   Skin:     General: Skin is warm.   Neurological:      General: No focal deficit present.      Mental Status: He is alert.             Significant Labs: All pertinent labs within the past 24 hours have been reviewed.  BMP:   Recent Labs    Lab 01/04/24  1738   GLU 91   *   K 3.6      CO2 18*   BUN 5*   CREATININE 0.6   CALCIUM 9.1     CBC:   Recent Labs   Lab 01/04/24  1738   WBC 9.69   HGB 11.2*   HCT 33.9*        CMP:   Recent Labs   Lab 01/04/24  1738   *   K 3.6      CO2 18*   GLU 91   BUN 5*   CREATININE 0.6   CALCIUM 9.1   PROT 10.1*   ALBUMIN 3.1*   BILITOT 0.4   ALKPHOS 84   AST 99*   ALT 34   ANIONGAP 13       Significant Imaging: I have reviewed all pertinent imaging results/findings within the past 24 hours.  X-Ray Hip 1 View Left (with Pelvis when performed)  Narrative: EXAMINATION:  XR HIP 1 VIEW LEFT (WITH PELVIS WHEN PERFORMED)    CLINICAL HISTORY:  Status post ORIF left femoral neck fracture;    TECHNIQUE:  XR HIP 1 VIEW LEFT (WITH PELVIS WHEN PERFORMED)    COMPARISON:  01/04/2024    FINDINGS:  Patient is status post screw and plate fixation of a transcervical left femoral neck fracture with anatomic alignment.  Postsurgical skin staples are seen laterally.  Impression: See above    Electronically signed by: Roldan Ferrell Jr  Date:    01/05/2024  Time:    16:08  SURG FL Surgery Fluoro Usage  See OP Notes for results.     IMPRESSION: See OP Notes for results.     This procedure was auto-finalized by: Virtual Radiologist

## 2024-01-05 NOTE — ED NOTES
Pt rounding complete.  Patient sleeping in bed, respirations even and unlabored.  Call light within reach.  Will continue to monitor.

## 2024-01-05 NOTE — CONSULTS
[facility] - Emergency Dept  Orthopedics  Consult Note    Patient Name: Damien Sanchez  MRN: [id number]  Admission Date: 1/4/2024  Hospital Length of Stay: 0 days  Attending Provider: Kendy att. providers found  Primary Care Provider: Kendy, Primary Doctor    Patient information was obtained from patient and ER records.     Inpatient consult to Orthopedic Surgery  Consult performed by: Lawrence Cosby MD  Consult ordered by: Chacho Chairez II, MD        Subjective:     Principal Problem:Closed fracture of neck of left femur    Chief Complaint:   Chief Complaint   Patient presents with    Fall     Reports fall 30 mins ago, complaining of severe L hip pain. Unable to walk in triage. States he does not know how he fell. + ETOH. Denies hitting head, denies syncope.        HPI:  58-year-old gentleman, HIV+, alcohol abuse complains of acute left hip pain x 1 day.  He presented to the [facility] which showed a left minimally displaced femoral neck fracture.  Admitted secondary to pain and inability to bear weight    No new subjective & objective note has been filed under this hospital service since the last note was generated.    Physical exam:  Pain with logroll.  Unable to flex without pain.  Wiggles toes to command.  Brisk capillary refill    X-rays:  Slight varus minimally displaced femoral neck fracture  Assessment/Plan:     Extensive discussion about options with the patient.  Given his age we will attempt to save the hip.  Obvious concerns about alcohol abuse and noncompliance but given alternative of geovanny vs PRAKASH he wishes to proceed with attempted salvage of his native hip.  I discussed at length with him my concern about his noncompliance with weight-bearing restrictions postoperatively as well as guarded prognosis with attempted ORIF in this situation and he understands.  All risks and benefits discussed at length and informed consent obtained for open reduction internal fixation left femoral  neck fracture.    Thank you for your consult.     Lawrence Carpenter MD  Orthopedics

## 2024-01-05 NOTE — ANESTHESIA PROCEDURE NOTES
Intubation    Date/Time: 1/5/2024 1:08 PM    Performed by: David Santiago CRNA  Authorized by: Rajan Medina MD    Intubation:     Induction:  Intravenous    Intubated:  Postinduction    Mask Ventilation:  Easy mask    Attempts:  1    Attempted By:  CRNA    Method of Intubation:  Video laryngoscopy    Blade:  Araiza 3    Laryngeal View Grade: Grade I - full view of cords      Difficult Airway Encountered?: No      Complications:  None    Airway Device:  Oral endotracheal tube    Airway Device Size:  8.0    Style/Cuff Inflation:  Cuffed    Inflation Amount (mL):  4    Tube secured:  22    Secured at:  The lips    Placement Verified By:  Capnometry    Complicating Factors:  None    Findings Post-Intubation:  BS equal bilateral

## 2024-01-05 NOTE — OR NURSING
VSS on RA. Pain is tolerable per pt. Dressing and PIV CDI. Meets PACU discharge criteria. Ready for transfer to [address] [place] [address]. Report given to DOMINGO Carrasco.

## 2024-01-05 NOTE — ANESTHESIA POSTPROCEDURE EVALUATION
Anesthesia Post Evaluation    Patient: Damien Sanchez    Procedure(s) Performed: Procedure(s) (LRB):  ORIF, HIP (Left)    Final Anesthesia Type: general      Patient location during evaluation: PACU  Patient participation: Yes- Able to Participate  Level of consciousness: awake and alert  Post-procedure vital signs: reviewed and stable  Pain management: adequate  Airway patency: patent  SONIA mitigation strategies: Extubation while patient is awake  PONV status at discharge: No PONV  Anesthetic complications: no      Cardiovascular status: hemodynamically stable  Respiratory status: unassisted  Hydration status: euvolemic  Follow-up not needed.              Vitals Value Taken Time   /78 01/05/24 1631   Temp 36.7 °C (98.1 °F) 01/05/24 1430   Pulse 96 01/05/24 1636   Resp 18 01/05/24 1615   SpO2 97 % 01/05/24 1636   Vitals shown include unvalidated device data.      No case tracking events are documented in the log.      Pain/Agapito Score: Pain Rating Prior to Med Admin: 7 (1/5/2024  3:02 PM)  Pain Rating Post Med Admin: 4 (1/5/2024  3:30 PM)  Agapito Score: 9 (1/5/2024  4:30 PM)

## 2024-01-05 NOTE — PLAN OF CARE
LMSW met with the patient at the bedside. Patient is alert and oriented with no communication barriers. Prior to admission, the patient is independent. Patient denies the use of HH. Patient has DME. Patient does have a PCP and refsued to be set up with one. Patient choice pharmacy is [facility]. Patient will need transportation home up discahrge.   [facility] - Emergency Dept (Observation)  Initial Discharge Assessment       Primary Care Provider: No, Primary Doctor    Admission Diagnosis: Hip pain [M25.559]    Admission Date: 1/4/2024  Expected Discharge Date:     Transition of Care Barriers: (P) Transportation    Payor: MEDICAID / Plan: AETNA [facility] / Product Type: Managed Medicaid /     No emergency contact information on file.    Discharge Plan A: (P) Home         [facility] - [place], [place] - [address]  [address]  [facility] [address]  Phone: [phone] Fax: [phone]    [facility]  [address]  [facility] [address]  Phone: [phone] Fax: [phone]      Initial Assessment (most recent)       Adult Discharge Assessment - 01/05/24 0844          Discharge Assessment    Assessment Type Discharge Planning Assessment     Confirmed/corrected address, phone number and insurance Yes     Confirmed Demographics Correct on Facesheet     Source of Information patient     People in Home alone (P)      Do you expect to return to your current living situation? Yes (P)      Do you have help at home or someone to help you manage your care at home? No (P)      Prior to hospitilization cognitive status: Alert/Oriented;No Deficits (P)      Current cognitive status: Alert/Oriented;No Deficits (P)      Equipment Currently Used at Home crutches (P)      Readmission within 30 days? No (P)      Patient currently being followed by outpatient case management? No (P)      Do you currently have service(s) that help you manage your care at  home? No (P)      Do you take prescription medications? Yes (P)      Do you have prescription coverage? Yes (P)      Do you have any problems affording any of your prescribed medications? No (P)      Is the patient taking medications as prescribed? yes (P)      How do you get to doctors appointments? public transportation (P)      Are you on dialysis? No (P)      Do you take coumadin? No (P)      Discharge Plan A Home (P)      Discharge Plan discussed with: Patient (P)      Transition of Care Barriers Transportation (P)

## 2024-01-05 NOTE — ANESTHESIA PREPROCEDURE EVALUATION
01/05/2024  Damien Sanchez is a 58 y.o., male.      Pre-op Assessment    I have reviewed the Patient Summary Reports.     I have reviewed the Nursing Notes. I have reviewed the NPO Status.   I have reviewed the Medications.     Review of Systems  Anesthesia Hx:  No problems with previous Anesthesia                Social:  Recreational Drugs       Hematology/Oncology:  Hematology Normal   Oncology Normal                                   EENT/Dental:  EENT/Dental Normal           Cardiovascular:     Hypertension                                        Pulmonary:  Pulmonary Normal                       Renal/:  Renal/ Normal                 Hepatic/GI:      Liver Disease,  Liver cirrhosis, drinks 24 beers/day          Neurological:  Neurology Normal                                      Endocrine:  Endocrine Normal            Psych:  Psychiatric Normal                    Physical Exam  General: Well nourished, Cooperative and Alert    Airway:  Mallampati: II   Mouth Opening: Normal  TM Distance: Normal  Tongue: Normal  Neck ROM: Normal ROM    Dental:        Anesthesia Plan  Type of Anesthesia, risks & benefits discussed:    Anesthesia Type: Gen ETT  Intra-op Monitoring Plan: Standard ASA Monitors  Post Op Pain Control Plan: multimodal analgesia  Induction:  IV  Airway Plan: Video  Informed Consent: Informed consent signed with the Patient and all parties understand the risks and agree with anesthesia plan.  All questions answered.   ASA Score: 3 Emergent  Anesthesia Plan Notes: Hiv, severe alcohol use.    Ready For Surgery From Anesthesia Perspective.     .

## 2024-01-05 NOTE — ASSESSMENT & PLAN NOTE
Drinks about 24 beers per day, last drink afternoon of admission  UDS +opiates/thc and EtOH 147  Start folic acid, thiamine, MVI  CIWA q8H, seizure precautions  Valium PRN  Denies history of alcohol withdrawals  1/5 AM: denies HA, anxiety, sweats, hallucinations

## 2024-01-05 NOTE — ED NOTES
Resumed pt care. Pt Dx. Left hip pain. A&Ox4. Pending General Surgery in am. Currently denies any complaints. Stated hip pain only when ambulates. Respirations even and unlabored. VSS. Safety measures in place. Will continue to monitor.

## 2024-01-05 NOTE — H&P
[facility] Emergency Dept (Observation)  [facility] Medicine  History & Physical    Patient Name: Damien Sanchez  MRN: [id number]  Patient Class: OP- Observation  Admission Date: 1/4/2024  Attending Physician: No att. providers found   Primary Care Provider: No primary care provider on file.         Patient information was obtained from patient, past medical records, and ER records.     Subjective:     Principal Problem:Closed fracture of neck of left femur    Chief Complaint:   Chief Complaint   Patient presents with    Fall     Reports fall 30 mins ago, complaining of severe L hip pain. Unable to walk in triage. States he does not know how he fell. + ETOH. Denies hitting head, denies syncope.        HPI: Damien Sanchez is a 58M with HIV on odefsey, HTN and alcohol abuse who presents for hip pain sustained after a fall. Today he was walking to his friend's house to celebrate her birthday when he fell on his left side. Denies hitting his head or losing consciousness. Since the fall he was unable to walk because of left hip pain. He drinks daily, including drinking today. Usually he drinks about 24 beers per day, denies history of alcohol withdrawal.     In ER: hip xray shows left humeral fracture, ortho consulted, IV ketorolac ordered    History reviewed. No pertinent past medical history.    History reviewed. No pertinent surgical history.    Review of patient's allergies indicates:   Allergen Reactions    Lisinopril Other (See Comments)     Angioedema^    Angioedema^   Angioedema^       No current facility-administered medications on file prior to encounter.     Current Outpatient Medications on File Prior to Encounter   Medication Sig    amLODIPine (NORVASC) 10 MG tablet Take 10 mg by mouth once daily.    atorvastatin (LIPITOR) 40 MG tablet Take 40 mg by mouth.    ODEFSEY 200-25-25 mg Tab Take 1 tablet by mouth every morning.    OYSTER SHELL CALCIUM-VIT D3 500 mg-5 mcg (200 unit) per tablet Take 1 tablet by mouth 2  (two) times daily.    ketoconazole (NIZORAL) 2 % cream Apply 2 % topically once daily.     Family History    None       Tobacco Use    Smoking status: Every Day     Current packs/day: 0.50     Types: Cigarettes    Smokeless tobacco: Never   Substance and Sexual Activity    Alcohol use: Not Currently    Drug use: Yes     Types: Marijuana    Sexual activity: Not on file     Review of Systems   Constitutional:  Positive for activity change. Negative for fatigue and fever.   Respiratory:  Negative for cough and shortness of breath.    Cardiovascular:  Negative for chest pain and leg swelling.   Gastrointestinal:  Negative for abdominal pain and nausea.   Musculoskeletal:  Positive for arthralgias, back pain (hip/flank) and gait problem.   Skin:  Negative for color change.   Allergic/Immunologic: Positive for immunocompromised state.   Neurological:  Negative for syncope and headaches.   Psychiatric/Behavioral:  Negative for agitation and confusion.      Objective:     Vital Signs (Most Recent):  Pulse: 81 (01/04/24 1540)  Resp: 20 (01/04/24 1540)  BP: (!) 146/84 (01/04/24 1540)  SpO2: 99 % (01/04/24 1540) Vital Signs (24h Range):  Pulse:  [81] 81  Resp:  [20] 20  SpO2:  [99 %] 99 %  BP: (146)/(84) 146/84     Weight: 59 kg (130 lb)  Body mass index is 20.36 kg/m².     Physical Exam  Constitutional:       General: He is not in acute distress.     Appearance: He is not ill-appearing.   HENT:      Head: Normocephalic and atraumatic.   Eyes:      Extraocular Movements: Extraocular movements intact.   Cardiovascular:      Rate and Rhythm: Normal rate and regular rhythm.   Pulmonary:      Effort: Pulmonary effort is normal. No respiratory distress.   Abdominal:      General: Abdomen is flat.      Palpations: Abdomen is soft.   Musculoskeletal:         General: Deformity present.      Right lower leg: No edema.      Left lower leg: No edema.      Comments: Left leg shortened    Skin:     General: Skin is warm and dry.    Neurological:      General: No focal deficit present.      Mental Status: He is alert.   Psychiatric:         Mood and Affect: Mood normal.         Behavior: Behavior normal.                Significant Labs: All pertinent labs within the past 24 hours have been reviewed.    Significant Imaging: I have reviewed all pertinent imaging results/findings within the past 24 hours.  Assessment/Plan:     * Closed fracture of neck of left femur  Left hip xray shows acute subcapital versus transcervical left femoral neck fracture  Orthopedics consulted in ER  Will keep NPO @ MN and hold pharmacological VTE prophylaxis in anticipation for surgery tomorrow AM  Pain control with tylenol, norco  PTOT to be consulted after Ortho eval      Alcohol dependence  Drinks about 24 beers per day, last drink afternoon of admission  UDS and EtOH pending  Start folic acid, thiamine, MVI  CIWA q8H, seizure precautions  Valium PRN  Denies history of alcohol withdrawals      Hypertension  Stable  Continue home norvasc 10mg daily    Human immunodeficiency virus (HIV) disease  No recent CD4 count, will add on for AM labs  Continue home hdcdpsretf-ygbrlamn-arzdlq ala 200-25-25 mg Tab 1 tablet daily (not on formulary, will have to order separately or have someone bring in meds from home)      HLD (hyperlipidemia)  Continue home lipitor        VTE Risk Mitigation (From admission, onward)           Ordered     IP VTE LOW RISK PATIENT  Once         01/04/24 8563                              Rubia Silveira PA-C  Department of Hospital Medicine  [facility] - Emergency Dept (Observation)

## 2024-01-05 NOTE — PROGRESS NOTES
[facility] Medicine  Progress Note    Patient Name: Damien Sanchez  MRN: [id number]  Patient Class: IP- Inpatient   Admission Date: 1/4/2024  Length of Stay: 0 days  Attending Physician: Ronda Jerry MD  Primary Care Provider: Kendy, Primary Doctor        Subjective:     Principal Problem:Closed fracture of neck of left femur        HPI:  Damien Sanchez is a 58M with HIV on odefsey, HTN and alcohol abuse who presents for hip pain sustained after a fall. Today he was walking to his friend's house to celebrate her birthday when he fell on his left side. Denies hitting his head or losing consciousness. Since the fall he was unable to walk because of left hip pain. He drinks daily, including drinking today. Usually he drinks about 24 beers per day, denies history of alcohol withdrawal.     In ER: hip xray shows left humeral fracture, ortho consulted, IV ketorolac ordered    Overview/Hospital Course:  No notes on file    Interval History: Pain to left upper leg 10/10 with minimal movement. Sensation and pulses intacta and 2+ bilaterally. Sisters at bedside. Patient awaiting his orthopedic surgery.     Review of Systems   Constitutional:  Negative for activity change and diaphoresis.   HENT:  Negative for congestion.    Respiratory:  Negative for apnea and shortness of breath.    Cardiovascular:  Negative for chest pain.   Gastrointestinal:  Negative for abdominal distention.   Genitourinary:  Negative for decreased urine volume.   Musculoskeletal:  Positive for arthralgias.   Neurological:  Negative for dizziness, speech difficulty, light-headedness and numbness.     Objective:     Vital Signs (Most Recent):  Temp: 98.1 °F (36.7 °C) (01/05/24 1430)  Pulse: 88 (01/05/24 1645)  Resp: 18 (01/05/24 1645)  BP: 137/75 (01/05/24 1645)  SpO2: 96 % (01/05/24 1645) Vital Signs (24h Range):  Temp:  [98 °F (36.7 °C)-98.4 °F (36.9 °C)] 98.1 °F (36.7 °C)  Pulse:  [83-98] 88  Resp:  [16-18] 18  SpO2:  [95 %-99 %]  96 %  BP: (120-146)/(66-81) 137/75     Weight: 59 kg (130 lb)  Body mass index is 20.36 kg/m².    Intake/Output Summary (Last 24 hours) at 1/5/2024 1654  Last data filed at 1/5/2024 1403  Gross per 24 hour   Intake 1000 ml   Output 550 ml   Net 450 ml         Physical Exam  Vitals reviewed.   HENT:      Mouth/Throat:      Mouth: Mucous membranes are dry.   Cardiovascular:      Rate and Rhythm: Normal rate.      Heart sounds: Normal heart sounds.   Pulmonary:      Effort: Pulmonary effort is normal.      Breath sounds: Normal breath sounds. No wheezing, rhonchi or rales.   Musculoskeletal:         General: Tenderness (left upper thigh) present.      Right lower leg: No edema.      Left lower leg: No edema.   Skin:     General: Skin is warm.   Neurological:      General: No focal deficit present.      Mental Status: He is alert.             Significant Labs: All pertinent labs within the past 24 hours have been reviewed.  BMP:   Recent Labs   Lab 01/04/24  1738   GLU 91   *   K 3.6      CO2 18*   BUN 5*   CREATININE 0.6   CALCIUM 9.1     CBC:   Recent Labs   Lab 01/04/24  1738   WBC 9.69   HGB 11.2*   HCT 33.9*        CMP:   Recent Labs   Lab 01/04/24  1738   *   K 3.6      CO2 18*   GLU 91   BUN 5*   CREATININE 0.6   CALCIUM 9.1   PROT 10.1*   ALBUMIN 3.1*   BILITOT 0.4   ALKPHOS 84   AST 99*   ALT 34   ANIONGAP 13       Significant Imaging: I have reviewed all pertinent imaging results/findings within the past 24 hours.  X-Ray Hip 1 View Left (with Pelvis when performed)  Narrative: EXAMINATION:  XR HIP 1 VIEW LEFT (WITH PELVIS WHEN PERFORMED)    CLINICAL HISTORY:  Status post ORIF left femoral neck fracture;    TECHNIQUE:  XR HIP 1 VIEW LEFT (WITH PELVIS WHEN PERFORMED)    COMPARISON:  01/04/2024    FINDINGS:  Patient is status post screw and plate fixation of a transcervical left femoral neck fracture with anatomic alignment.  Postsurgical skin staples are seen  laterally.  Impression: See above    Electronically signed by: Roldan Ferrell Jr  Date:    01/05/2024  Time:    16:08  SURG FL Surgery Fluoro Usage  See OP Notes for results.     IMPRESSION: See OP Notes for results.     This procedure was auto-finalized by: Virtual Radiologist        Assessment/Plan:      * Closed fracture of neck of left femur  Left hip xray shows acute subcapital versus transcervical left femoral neck fracture  Orthopedics consulted in ER  Will keep NPO @ MN and hold pharmacological VTE prophylaxis in anticipation for surgery tomorrow AM  Pain control with tylenol, norco  PTOT to be consulted after Ortho eval        Hypertension  Stable  Continue home norvasc 10mg daily    Human immunodeficiency virus (HIV) disease  Updated CD4 count pending  Continue home mkoyoyzcym-zopwbijx-jqewad ala 200-25-25 mg Tab 1 tablet daily (not on formulary, will have to order separately or have someone bring in meds from home)        HLD (hyperlipidemia)  Continue home lipitor      Alcohol dependence  Drinks about 24 beers per day, last drink afternoon of admission  UDS +opiates/thc and EtOH 147  Start folic acid, thiamine, MVI  CIWA q8H, seizure precautions  Valium PRN  Denies history of alcohol withdrawals  1/5 AM: denies HA, anxiety, sweats, hallucinations         VTE Risk Mitigation (From admission, onward)           Ordered     IP VTE LOW RISK PATIENT  Once         01/04/24 1713                    Discharge Planning   MANUEL: 1/9/2024     Code Status: Full Code   Is the patient medically ready for discharge?:     Reason for patient still in hospital (select all that apply): Patient trending condition and Treatment  Discharge Plan A: (P) Home                  Portia Griffith DNP  [facility]   [facility] ([facility]

## 2024-01-05 NOTE — SUBJECTIVE & OBJECTIVE
History reviewed. No pertinent past medical history.    History reviewed. No pertinent surgical history.    Review of patient's allergies indicates:   Allergen Reactions    Lisinopril Other (See Comments)     Angioedema^    Angioedema^   Angioedema^       No current facility-administered medications on file prior to encounter.     Current Outpatient Medications on File Prior to Encounter   Medication Sig    amLODIPine (NORVASC) 10 MG tablet Take 10 mg by mouth once daily.    atorvastatin (LIPITOR) 40 MG tablet Take 40 mg by mouth.    ODEFSEY 200-25-25 mg Tab Take 1 tablet by mouth every morning.    OYSTER SHELL CALCIUM-VIT D3 500 mg-5 mcg (200 unit) per tablet Take 1 tablet by mouth 2 (two) times daily.    ketoconazole (NIZORAL) 2 % cream Apply 2 % topically once daily.     Family History    None       Tobacco Use    Smoking status: Every Day     Current packs/day: 0.50     Types: Cigarettes    Smokeless tobacco: Never   Substance and Sexual Activity    Alcohol use: Not Currently    Drug use: Yes     Types: Marijuana    Sexual activity: Not on file     Review of Systems   Constitutional:  Positive for activity change. Negative for fatigue and fever.   Respiratory:  Negative for cough and shortness of breath.    Cardiovascular:  Negative for chest pain and leg swelling.   Gastrointestinal:  Negative for abdominal pain and nausea.   Musculoskeletal:  Positive for arthralgias, back pain (hip/flank) and gait problem.   Skin:  Negative for color change.   Allergic/Immunologic: Positive for immunocompromised state.   Neurological:  Negative for syncope and headaches.   Psychiatric/Behavioral:  Negative for agitation and confusion.      Objective:     Vital Signs (Most Recent):  Pulse: 81 (01/04/24 1540)  Resp: 20 (01/04/24 1540)  BP: (!) 146/84 (01/04/24 1540)  SpO2: 99 % (01/04/24 1540) Vital Signs (24h Range):  Pulse:  [81] 81  Resp:  [20] 20  SpO2:  [99 %] 99 %  BP: (146)/(84) 146/84     Weight: 59 kg (130 lb)  Body  mass index is 20.36 kg/m².     Physical Exam  Constitutional:       General: He is not in acute distress.     Appearance: He is not ill-appearing.   HENT:      Head: Normocephalic and atraumatic.   Eyes:      Extraocular Movements: Extraocular movements intact.   Cardiovascular:      Rate and Rhythm: Normal rate and regular rhythm.   Pulmonary:      Effort: Pulmonary effort is normal. No respiratory distress.   Abdominal:      General: Abdomen is flat.      Palpations: Abdomen is soft.   Musculoskeletal:         General: Deformity present.      Right lower leg: No edema.      Left lower leg: No edema.      Comments: Left leg shortened    Skin:     General: Skin is warm and dry.   Neurological:      General: No focal deficit present.      Mental Status: He is alert.   Psychiatric:         Mood and Affect: Mood normal.         Behavior: Behavior normal.                Significant Labs: All pertinent labs within the past 24 hours have been reviewed.    Significant Imaging: I have reviewed all pertinent imaging results/findings within the past 24 hours.

## 2024-01-05 NOTE — ASSESSMENT & PLAN NOTE
Updated CD4 count pending  Continue home dgrnqmiyfq-hvisyvug-byzhaj ala 200-25-25 mg Tab 1 tablet daily (not on formulary, will have to order separately or have someone bring in meds from home)

## 2024-01-05 NOTE — OP NOTE
[facility]  Surgery Department  Operative Note    SUMMARY     Date of Procedure: 1/5/2024     Procedure:   Open reduction internal fixation left femoral neck fracture    Surgeon(s) and Role:     * Lawrence Cosby MD - Primary    Assistant: none    Pre-Operative Diagnosis:   Left femoral neck fracture    Post-Operative Diagnosis:   Same    Anesthesia: General    Technical Procedures Used: Mr. Sanchez was taken to the operating room 01/05/2024 for planned open reduction internal fixation of a left minimally displaced femoral neck fracture.  He was brought to the operating room and placed in the supine position.  General endotracheal anesthesia was administered.  He was given 2 g of Ancef preoperatively.  He was carefully placed on the [place] fracture table with all bony prominences padded appropriately.  C-arm fluoroscopy was brought in and with slight longitudinal traction the very minimal varus the fracture appeared to be in was reduced down to very slight valgus.  The left hip was then prepped and draped in the usual sterile fashion and a time-out procedure was performed.    I percutaneously placed the threaded guide pin in the center center position.  I used the kickstand to help guide the angle into the appropriate position.  After the appropriate positioning was confirmed the opening Reamer was then used to prepare the canal for the bolt.  The bolt was then assembled at the back table on the guide.  It was then advanced to the drilled depth.  With the targeting guide in place and with the lateral plate firmly against the lateral femoral cortex I then placed a bicortical screw through the plate with good purchase.  Finally I drilled and placed the anti rotation screw to the appropriate depth.  Final fluoroscopic images were taken which showed appropriate placement of the Synthes FNS implant with 1 hole plate.    Minimal bleeding was encountered during the surgery.  The deep tissues were closed  with 2-0 Vicryl, subcutaneous tissues closed with 2-0 Vicryl and the skin was closed with staples.  A soft dressing was applied and the patient was then taken to the PACU without complication.    Description of the Findings of the Procedure:  Synthes FNS 1 hole plate system    Significant Surgical Tasks Conducted by the Assistant(s), if Applicable: n/a    Complications: No    Estimated Blood Loss (EBL): 15 mL           Implants:   Implant Name Type Inv. Item Serial No.  Lot No. LRB No. Used Action   PLATE FNS 1 HOLE TI STRL - [id number]  PLATE FNS 1 HOLE TI STRL  SYNTHES [id number] Left 1 Implanted   BOLT FEMORAL NECK SYS 100LEN-S - [id number]  BOLT FEMORAL NECK SYS 100LEN-S  SYNTHES [id number] Left 1 Implanted   antiroation screw for femoral neck system     [id number] Left 1 Implanted   ti locking scr lf-tpng v/t25 stardrive     [id number] Left 1 Implanted       Specimens:   Specimen (24h ago, onward)      None                    Condition: Good    Disposition: PACU - hemodynamically stable.    Attestation: I was present and scrubbed for the entire procedure.

## 2024-01-06 LAB
ANION GAP SERPL CALC-SCNC: 10 MMOL/L (ref 8–16)
ANION GAP SERPL CALC-SCNC: 10 MMOL/L (ref 8–16)
BASOPHILS # BLD AUTO: 0.03 K/UL (ref 0–0.2)
BASOPHILS NFR BLD: 0.3 % (ref 0–1.9)
BUN SERPL-MCNC: 9 MG/DL (ref 6–20)
BUN SERPL-MCNC: 9 MG/DL (ref 6–20)
CALCIUM SERPL-MCNC: 8.4 MG/DL (ref 8.7–10.5)
CALCIUM SERPL-MCNC: 8.4 MG/DL (ref 8.7–10.5)
CHLORIDE SERPL-SCNC: 105 MMOL/L (ref 95–110)
CHLORIDE SERPL-SCNC: 105 MMOL/L (ref 95–110)
CO2 SERPL-SCNC: 21 MMOL/L (ref 23–29)
CO2 SERPL-SCNC: 21 MMOL/L (ref 23–29)
CREAT SERPL-MCNC: 0.7 MG/DL (ref 0.5–1.4)
CREAT SERPL-MCNC: 0.7 MG/DL (ref 0.5–1.4)
DIFFERENTIAL METHOD BLD: ABNORMAL
EOSINOPHIL # BLD AUTO: 0 K/UL (ref 0–0.5)
EOSINOPHIL NFR BLD: 0.1 % (ref 0–8)
ERYTHROCYTE [DISTWIDTH] IN BLOOD BY AUTOMATED COUNT: 15 % (ref 11.5–14.5)
ERYTHROCYTE [DISTWIDTH] IN BLOOD BY AUTOMATED COUNT: 15 % (ref 11.5–14.5)
EST. GFR  (NO RACE VARIABLE): >60 ML/MIN/1.73 M^2
EST. GFR  (NO RACE VARIABLE): >60 ML/MIN/1.73 M^2
GLUCOSE SERPL-MCNC: 118 MG/DL (ref 70–110)
GLUCOSE SERPL-MCNC: 118 MG/DL (ref 70–110)
HCT VFR BLD AUTO: 25.7 % (ref 40–54)
HCT VFR BLD AUTO: 25.7 % (ref 40–54)
HGB BLD-MCNC: 8.5 G/DL (ref 14–18)
HGB BLD-MCNC: 8.5 G/DL (ref 14–18)
IMM GRANULOCYTES # BLD AUTO: 0.04 K/UL (ref 0–0.04)
IMM GRANULOCYTES NFR BLD AUTO: 0.4 % (ref 0–0.5)
LYMPHOCYTES # BLD AUTO: 1.8 K/UL (ref 1–4.8)
LYMPHOCYTES NFR BLD: 17.8 % (ref 18–48)
MCH RBC QN AUTO: 32.9 PG (ref 27–31)
MCH RBC QN AUTO: 32.9 PG (ref 27–31)
MCHC RBC AUTO-ENTMCNC: 33.1 G/DL (ref 32–36)
MCHC RBC AUTO-ENTMCNC: 33.1 G/DL (ref 32–36)
MCV RBC AUTO: 100 FL (ref 82–98)
MCV RBC AUTO: 100 FL (ref 82–98)
MONOCYTES # BLD AUTO: 1.5 K/UL (ref 0.3–1)
MONOCYTES NFR BLD: 14.8 % (ref 4–15)
NEUTROPHILS # BLD AUTO: 6.8 K/UL (ref 1.8–7.7)
NEUTROPHILS NFR BLD: 66.6 % (ref 38–73)
NRBC BLD-RTO: 0 /100 WBC
PLATELET # BLD AUTO: 136 K/UL (ref 150–450)
PLATELET # BLD AUTO: 136 K/UL (ref 150–450)
PMV BLD AUTO: 10.1 FL (ref 9.2–12.9)
PMV BLD AUTO: 10.1 FL (ref 9.2–12.9)
POTASSIUM SERPL-SCNC: 3.6 MMOL/L (ref 3.5–5.1)
POTASSIUM SERPL-SCNC: 3.6 MMOL/L (ref 3.5–5.1)
RBC # BLD AUTO: 2.58 M/UL (ref 4.6–6.2)
RBC # BLD AUTO: 2.58 M/UL (ref 4.6–6.2)
SODIUM SERPL-SCNC: 136 MMOL/L (ref 136–145)
SODIUM SERPL-SCNC: 136 MMOL/L (ref 136–145)
WBC # BLD AUTO: 10.18 K/UL (ref 3.9–12.7)
WBC # BLD AUTO: 10.18 K/UL (ref 3.9–12.7)

## 2024-01-06 PROCEDURE — 85025 COMPLETE CBC W/AUTO DIFF WBC: CPT | Performed by: ORTHOPAEDIC SURGERY

## 2024-01-06 PROCEDURE — 63600175 PHARM REV CODE 636 W HCPCS: Performed by: NURSE PRACTITIONER

## 2024-01-06 PROCEDURE — 97535 SELF CARE MNGMENT TRAINING: CPT

## 2024-01-06 PROCEDURE — 25000003 PHARM REV CODE 250: Performed by: ORTHOPAEDIC SURGERY

## 2024-01-06 PROCEDURE — 97116 GAIT TRAINING THERAPY: CPT

## 2024-01-06 PROCEDURE — 36415 COLL VENOUS BLD VENIPUNCTURE: CPT | Performed by: ORTHOPAEDIC SURGERY

## 2024-01-06 PROCEDURE — 94761 N-INVAS EAR/PLS OXIMETRY MLT: CPT

## 2024-01-06 PROCEDURE — 80048 BASIC METABOLIC PNL TOTAL CA: CPT | Performed by: ORTHOPAEDIC SURGERY

## 2024-01-06 PROCEDURE — 11000001 HC ACUTE MED/SURG PRIVATE ROOM

## 2024-01-06 PROCEDURE — 97161 PT EVAL LOW COMPLEX 20 MIN: CPT

## 2024-01-06 PROCEDURE — 97166 OT EVAL MOD COMPLEX 45 MIN: CPT

## 2024-01-06 RX ORDER — ENOXAPARIN SODIUM 100 MG/ML
40 INJECTION SUBCUTANEOUS EVERY 24 HOURS
Status: DISCONTINUED | OUTPATIENT
Start: 2024-01-06 | End: 2024-01-08

## 2024-01-06 RX ADMIN — RILPIVIRINE HYDROCHLORIDE 25 MG: 25 TABLET, FILM COATED ORAL at 07:01

## 2024-01-06 RX ADMIN — HYDROCODONE BITARTRATE AND ACETAMINOPHEN 1 TABLET: 10; 325 TABLET ORAL at 07:01

## 2024-01-06 RX ADMIN — ATORVASTATIN CALCIUM 40 MG: 20 TABLET, FILM COATED ORAL at 08:01

## 2024-01-06 RX ADMIN — HYDROCODONE BITARTRATE AND ACETAMINOPHEN 1 TABLET: 10; 325 TABLET ORAL at 10:01

## 2024-01-06 RX ADMIN — MELATONIN TAB 3 MG 6 MG: 3 TAB at 10:01

## 2024-01-06 RX ADMIN — ENOXAPARIN SODIUM 40 MG: 40 INJECTION SUBCUTANEOUS at 09:01

## 2024-01-06 RX ADMIN — EMTRICITABINE AND TENOFOVIR ALAFENAMIDE 1 TABLET: 200; 25 TABLET ORAL at 07:01

## 2024-01-06 RX ADMIN — AMLODIPINE BESYLATE 10 MG: 5 TABLET ORAL at 09:01

## 2024-01-06 RX ADMIN — THERA TABS 1 TABLET: TAB at 09:01

## 2024-01-06 RX ADMIN — HYDROCODONE BITARTRATE AND ACETAMINOPHEN 1 TABLET: 10; 325 TABLET ORAL at 12:01

## 2024-01-06 RX ADMIN — POLYETHYLENE GLYCOL 3350 17 G: 17 POWDER, FOR SOLUTION ORAL at 09:01

## 2024-01-06 RX ADMIN — FOLIC ACID 1 MG: 1 TABLET ORAL at 09:01

## 2024-01-06 RX ADMIN — HYDROCODONE BITARTRATE AND ACETAMINOPHEN 1 TABLET: 10; 325 TABLET ORAL at 04:01

## 2024-01-06 RX ADMIN — Medication 100 MG: at 09:01

## 2024-01-06 RX ADMIN — HYDROCODONE BITARTRATE AND ACETAMINOPHEN 1 TABLET: 10; 325 TABLET ORAL at 01:01

## 2024-01-06 RX ADMIN — MELATONIN TAB 3 MG 6 MG: 3 TAB at 01:01

## 2024-01-06 NOTE — CARE UPDATE
01/06/24 0900   Patient Assessment/Suction   Level of Consciousness (AVPU) alert   Respiratory Effort Normal;Unlabored   Expansion/Accessory Muscles/Retractions no retractions;no use of accessory muscles   PRE-TX-O2   Device (Oxygen Therapy) room air   SpO2 (!) 93 %   Pulse Oximetry Type Intermittent   $ Pulse Oximetry - Multiple Charge Pulse Oximetry - Multiple

## 2024-01-06 NOTE — PLAN OF CARE
Problem: Occupational Therapy  Goal: Occupational Therapy Goal  Description: Goals to be met by: 1/20/2024     Patient will increase functional independence with ADLs by performing:    UE Dressing with Set-up Assistance.  LE Dressing with Contact Guard Assistance.  Grooming while seated at sink with Set-up Assistance.  Toileting from bedside commode with Stand-by Assistance for hygiene and clothing management.   Toilet transfer to bedside commode with Stand-by Assistance.    Outcome: Ongoing, Progressing    Initial OT eval/treat complete.  No DME in use PTA.  Currently needs BSC, W/C, and TTB; will defer AD needs to PT.  Recommend post acute High Intensity therapy.  To benefit from continued acute care OT services to increase independence in self-care/functional transfers.  OT to follow.

## 2024-01-06 NOTE — PROGRESS NOTES
[facility] - [facility] Surg ([address] Medicine  Progress Note    Patient Name: Damien Sanchez  MRN: [id number]  Patient Class: IP- Inpatient   Admission Date: 1/4/2024  Length of Stay: 1 days  Attending Physician: Ronda Jerry MD  Primary Care Provider: Kendy, Primary Doctor        Subjective:     Principal Problem:Closed fracture of neck of left femur        HPI:  Damien Sanchez is a 58M with HIV on odefsey, HTN and alcohol abuse who presents for hip pain sustained after a fall. Today he was walking to his friend's house to celebrate her birthday when he fell on his left side. Denies hitting his head or losing consciousness. Since the fall he was unable to walk because of left hip pain. He drinks daily, including drinking today. Usually he drinks about 24 beers per day, denies history of alcohol withdrawal.     In ER: hip xray shows left humeral fracture, ortho consulted, IV ketorolac ordered    Overview/Hospital Course:  Patient had a left ORIF on 1/5.  He was able to ambulate with therapy on 1/6 and his pain is controlled. Lovenox started for DVT prophylaxis. Surgical incision without bruising or drainage.     Interval History: Mr Guzman reports that his pain is well controlled and he was able to participate with therapy. NV intact.     Review of Systems   Constitutional:  Negative for activity change and fever.   Respiratory:  Negative for chest tightness and shortness of breath.    Cardiovascular:  Negative for chest pain.   Gastrointestinal:  Negative for abdominal distention and constipation.   Genitourinary:  Negative for difficulty urinating.   Musculoskeletal:  Positive for arthralgias.   Neurological:  Negative for dizziness.   Psychiatric/Behavioral:  Negative for agitation.      Objective:     Vital Signs (Most Recent):  Temp: 98.1 °F (36.7 °C) (01/06/24 1109)  Pulse: 77 (01/06/24 1109)  Resp: 20 (01/06/24 1216)  BP: 111/65 (01/06/24 1109)  SpO2: 96 % (01/06/24 1109) Vital Signs (24h Range):  Temp:  [97  °F (36.1 °C)-98.7 °F (37.1 °C)] 98.1 °F (36.7 °C)  Pulse:  [77-95] 77  Resp:  [16-20] 20  SpO2:  [93 %-99 %] 96 %  BP: (111-146)/(61-81) 111/65     Weight: 59 kg (130 lb 0.1 oz)  Body mass index is 20.36 kg/m².    Intake/Output Summary (Last 24 hours) at 1/6/2024 1309  Last data filed at 1/6/2024 0645  Gross per 24 hour   Intake 1060 ml   Output 1175 ml   Net -115 ml         Physical Exam  Vitals reviewed.   HENT:      Head: Normocephalic.   Cardiovascular:      Rate and Rhythm: Normal rate.   Pulmonary:      Effort: Pulmonary effort is normal.      Breath sounds: Normal breath sounds.   Musculoskeletal:         General: Swelling (SIAVN thigh), tenderness and signs of injury present.      Comments: Left leg decreased ROM   Skin:     General: Skin is warm.   Neurological:      Mental Status: He is alert and oriented to person, place, and time.   Psychiatric:         Mood and Affect: Mood normal.         Behavior: Behavior normal.             Significant Labs: All pertinent labs within the past 24 hours have been reviewed.  BMP:   Recent Labs   Lab 01/06/24  0552   *  118*     136   K 3.6  3.6     105   CO2 21*  21*   BUN 9  9   CREATININE 0.7  0.7   CALCIUM 8.4*  8.4*     CBC:   Recent Labs   Lab 01/04/24  1738 01/06/24  0552   WBC 9.69 10.18  10.18   HGB 11.2* 8.5*  8.5*   HCT 33.9* 25.7*  25.7*    136*  136*     CMP:   Recent Labs   Lab 01/04/24  1738 01/06/24  0552   * 136  136   K 3.6 3.6  3.6    105  105   CO2 18* 21*  21*   GLU 91 118*  118*   BUN 5* 9  9   CREATININE 0.6 0.7  0.7   CALCIUM 9.1 8.4*  8.4*   PROT 10.1*  --    ALBUMIN 3.1*  --    BILITOT 0.4  --    ALKPHOS 84  --    AST 99*  --    ALT 34  --    ANIONGAP 13 10  10       Significant Imaging: I have reviewed all pertinent imaging results/findings within the past 24 hours.  X-Ray Hip 1 View Left (with Pelvis when performed)  Narrative: EXAMINATION:  XR HIP 1 VIEW LEFT (WITH PELVIS WHEN  PERFORMED)    CLINICAL HISTORY:  Status post ORIF left femoral neck fracture;    TECHNIQUE:  XR HIP 1 VIEW LEFT (WITH PELVIS WHEN PERFORMED)    COMPARISON:  01/04/2024    FINDINGS:  Patient is status post screw and plate fixation of a transcervical left femoral neck fracture with anatomic alignment.  Postsurgical skin staples are seen laterally.  Impression: See above    Electronically signed by: Roldan Ferrell Jr  Date:    01/05/2024  Time:    16:08  SURG FL Surgery Fluoro Usage  See OP Notes for results.     IMPRESSION: See OP Notes for results.     This procedure was auto-finalized by: Virtual Radiologist        Assessment/Plan:      * Closed fracture of neck of left femur  POST OP day 1: Left ORIF  Lovenox started for DVT prophylaxis  Left hip xray shows acute subcapital versus transcervical left femoral neck fracture  Ortho Following  PTOT working with patient        Hypertension  Stable  Continue home norvasc 10mg daily    Human immunodeficiency virus (HIV) disease  Updated CD4 count pending  Continue home qnqmbjkdsb-bznefbyt-nwvqig ala 200-25-25 mg Tab 1 tablet daily (not on formulary, will have to order separately or have someone bring in meds from home)        HLD (hyperlipidemia)  Continue home lipitor      Alcohol dependence  Drinks about 24 beers per day, last drink afternoon of admission  UDS +opiates/thc and EtOH 147  Start folic acid, thiamine, MVI  CIWA q8H, seizure precautions  Valium PRN  Denies history of alcohol withdrawals  1/5 AM: denies HA, anxiety, sweats, hallucinations   -Remain symptom free of ETOh withdrawals        VTE Risk Mitigation (From admission, onward)           Ordered     enoxaparin injection 40 mg  Every 24 hours         01/06/24 0915     IP VTE LOW RISK PATIENT  Once         01/04/24 1713                    Discharge Planning   MANUEL: 1/9/2024     Code Status: Full Code   Is the patient medically ready for discharge?:     Reason for patient still in hospital (select all that  apply): Patient trending condition, Treatment, and PT / OT recommendations  Discharge Plan A: (P) Home                  Portia Griffith DNP  Department of Hospital Medicine   [facility] ([address])

## 2024-01-06 NOTE — PT/OT/SLP EVAL
Occupational Therapy   Evaluation and Treatment    Name: Damien Sanchez  MRN: [id number]  Admitting Diagnosis: Closed fracture of neck of left femur  Recent Surgery: Procedure(s) (LRB):  ORIF, HIP (Left) 1 Day Post-Op    Recommendations:     Discharge Recommendations: High Intensity Therapy  Discharge Equipment Recommendations:  bath bench, bedside commode, wheelchair (to defer AD needs to PT)  Barriers to discharge:  Inaccessible home environment, Decreased caregiver support (current functional level)    Assessment:   Initial OT eval/treat complete.  Requires MIN A for functional transfers and short household level ambulation using hop to technique; consistent cues needed for LLE positioning to maintain LLE NWB during  transitional movements though with good follow through of safe hand placement.  Donned paper scrub pants with MOD A for threading clothing and requiring assist with steadying/safety while pulling clothing up in stance; good follow through of cues/skilled instruction for maintaining LLE NWB during task and alternating 1UE for ADL and other for UE support at RW.  No DME in use PTA.  Currently needs BSC, W/C, and TTB; will defer AD needs to PT.  Recommend post acute High Intensity therapy.  Pt. Lives with roommate that works.  Pt. Was previously independent in ADL, cleaning, navigating public transportation, and taking walks to fast food restaurants near his home.  Will require intense interdisciplinary therapy services to return safely to previous living situation and PLOF.  To benefit from continued acute care OT services to increase independence in self-care/functional transfers.  OT to follow.     Damien Sanchez is a 58 y.o. male with a medical diagnosis of Closed fracture of neck of left femur.  He presents with below deficits decreasing independence in self-care/functional transfer. Performance deficits affecting function: weakness, impaired endurance, impaired self care skills, impaired functional  mobility, gait instability, impaired balance, decreased lower extremity function, decreased safety awareness, pain, decreased ROM, orthopedic precautions.      Rehab Prognosis: Good; patient would benefit from acute skilled OT services to address these deficits and reach maximum level of function.       Plan:     Patient to be seen 5 x/week to address the above listed problems via self-care/home management, therapeutic activities, therapeutic exercises  Plan of Care Expires: 01/20/24  Plan of Care Reviewed with: patient    Subjective     Chief Complaint:  With c/o L-hip pain.  Patient/Family Comments/goals: No goals stated at this time.      Occupational Profile:  Lives with roommate in [facility] with 3 OPAL with B-handrails too far apart to reach simultaneously; bathroom setup as tub with standard toilet.  Previously independent with ADL.  Pt. Does household cleaning and often buys food or walks to fast food restaurants.  Navigates bus to reach MD appts.    Equipment Used at Home: none  Assistance upon Discharge: Roommate works.     Pain/Comfort:  Pain Rating 1: 7/10 (prior to activity)  Location - Side 1: Left  Location - Orientation 1: generalized  Location 1: hip  Pain Addressed 1: Distraction, Cessation of Activity, Nurse notified, Reposition, Pre-medicate for activity  Pain Rating Post-Intervention 1: 5/10 (after activity)    Patients cultural, spiritual, [employer]beterian conflicts given the current situation:  (None stated.)    Objective:     Communicated with: Carolina LINDSAY RN prior to session.  Patient found HOB elevated with peripheral IV upon OT entry to room.    General Precautions: Standard, fall, seizure  Orthopedic Precautions: LLE non weight bearing  Braces: N/A  Respiratory Status: Room air    Occupational Performance:    Bed Mobility:    Supine<>sit with HOB slightly elevated when coming into sit with SBA and increased time.      Functional Mobility/Transfers:  Sit>stand EOB>RW with increased verbal cuing for  safe hand placement and LLE positioning during transitional movements.  Ambulating approx. 8 feet bed<>BSC and performing BSC transfer via hop to technique with MIN A and MIN cuing for safe timing of controlled descent.  Good follow through of safe hand placement during controlled descent though still requiring cues for safe LLE foot placement during transition when standing from BSC.      Activities of Daily Living:  Donned paper scrub pants requiring assist for threading while in partial long sit in bed though once seated EOB able to pull pants over knees; given instruction on alternative 1UE for pulling up pants and other for steadying at RW as well as cue for keeping LLE off of floor during task to maintaining NWB with good follow through.  Overall requiring MOD A for donning pants this day.      Cognitive/Visual Perceptual:  Cognitive/Psychosocial Skills:  -       Oriented to: Person, Place, Time, and Situation   -       Follows Commands/attention:Follows one-step commands  -       Communication: able to make basic needs known and answer questions appropriately  -       Memory: No Deficits noted  -       Safety awareness/insight to disability: impaired   -       Mood/Affect/Coping skills/emotional control: Cooperative  Visual/Perceptual:  -grossly intact    Physical Exam:  Postural examination/scapula alignment: -       Rounded shoulders  -       Forward head  Skin integrity: Visible skin intact  Upper Extremity Range of Motion:  -       Right Upper Extremity: WFL  -       Left Upper Extremity: WFL  Upper Extremity Strength: -       Right Upper Extremity: WFL  -       Left Upper Extremity: WFL   Strength: -       Right Upper Extremity: WFL  -       Left Upper Extremity: WFL  Fine Motor Coordination:    -       Intact  Left hand thumb/finger opposition skills and Right hand thumb/finger opposition skills    AMPAC 6 Click ADL:  AMPAC Total Score: 16    Treatment & Education:  Educated on role of OT, POC, and  LLE NWB.    Supine<>sit with HOB slightly elevated when coming into sit with SBA and increased time.    Sit>stand EOB>RW with increased verbal cuing for safe hand placement and LLE positioning during transitional movements.  Ambulating approx. 8 feet bed<>BSC and performing BSC transfer via hop to technique with MIN A and MIN cuing for safe timing of controlled descent.  Good follow through of safe hand placement during controlled descent though still requiring cues for safe LLE foot placement during transition when standing from BSC.    Donned paper scrub pants requiring assist for threading while in partial long sit in bed though once seated EOB able to pull pants over knees; given instruction on alternative 1UE for pulling up pants and other for steadying at RW as well as cue for keeping LLE off of floor during task to maintaining NWB with good follow through.  Overall requiring MOD A for donning pants this day.    Received call light review and importance of calling for assist as needed.     Patient left HOB elevated with all lines intact, call button in reach, and nursing notified    GOALS:   Multidisciplinary Problems       Occupational Therapy Goals          Problem: Occupational Therapy    Goal Priority Disciplines Outcome Interventions   Occupational Therapy Goal     OT, PT/OT Ongoing, Progressing    Description: Goals to be met by: 1/20/2024     Patient will increase functional independence with ADLs by performing:    UE Dressing with Set-up Assistance.  LE Dressing with Contact Guard Assistance.  Grooming while seated at sink with Set-up Assistance.  Toileting from bedside commode with Stand-by Assistance for hygiene and clothing management.   Toilet transfer to bedside commode with Stand-by Assistance.                         History:     History reviewed. No pertinent past medical history.    History reviewed. No pertinent surgical history.    Time Tracking:     OT Date of Treatment: 01/06/24  OT Start  Time: 1706  OT Stop Time: 1735  OT Total Time (min): 29 min    Billable Minutes:Evaluation 15  Self Care/Home Management 14    1/6/2024

## 2024-01-06 NOTE — PLAN OF CARE
Problem: Physical Therapy  Goal: Physical Therapy Goal  Description: Patient will increase functional independence with mobility by performin. Sit<>supine with indep.  2. Sit<>stand using indep and RW, LLE NWB.  3. Gait x 100 ft using RW and  MOD I, LLE NWB.  4. Ascend/descend 3 stair(s) using  handrail(s) and LLE NWB.        Outcome: Ongoing, Progressing     Pt aware of his precautions (LLE NWB). Bed mobility was min A for LLE, sit<>stand with RW and CGA, 3 trials x ambulated 10 ft with RW LLE NWB, but any further. Unable to ambulate further than that at this time and maintain precautions. He would most benefit from high level rehab, if not moderate would be appropriate, too. He has steps to get into his home and no real support or assist.

## 2024-01-06 NOTE — ASSESSMENT & PLAN NOTE
Drinks about 24 beers per day, last drink afternoon of admission  UDS +opiates/thc and EtOH 147  Start folic acid, thiamine, MVI  CIWA q8H, seizure precautions  Valium PRN  Denies history of alcohol withdrawals  1/5 AM: denies HA, anxiety, sweats, hallucinations   -Remain symptom free of ETOh withdrawals

## 2024-01-06 NOTE — ASSESSMENT & PLAN NOTE
Updated CD4 count pending  Continue home gogfmgempc-gjxzjiso-uwcdwj ala 200-25-25 mg Tab 1 tablet daily (not on formulary, will have to order separately or have someone bring in meds from home)

## 2024-01-06 NOTE — PROGRESS NOTES
POD#1 s/p ORIF left hip fx  AF  8.5/25.7  Left hip: dry mild bloody spotting                DNVI  - NWB LLE  - DVT prophylaxis

## 2024-01-06 NOTE — SUBJECTIVE & OBJECTIVE
Interval History: Mr Guzman reports that his pain is well controlled and he was able to participate with therapy. NV intact.     Review of Systems   Constitutional:  Negative for activity change and fever.   Respiratory:  Negative for chest tightness and shortness of breath.    Cardiovascular:  Negative for chest pain.   Gastrointestinal:  Negative for abdominal distention and constipation.   Genitourinary:  Negative for difficulty urinating.   Musculoskeletal:  Positive for arthralgias.   Neurological:  Negative for dizziness.   Psychiatric/Behavioral:  Negative for agitation.      Objective:     Vital Signs (Most Recent):  Temp: 98.1 °F (36.7 °C) (01/06/24 1109)  Pulse: 77 (01/06/24 1109)  Resp: 20 (01/06/24 1216)  BP: 111/65 (01/06/24 1109)  SpO2: 96 % (01/06/24 1109) Vital Signs (24h Range):  Temp:  [97 °F (36.1 °C)-98.7 °F (37.1 °C)] 98.1 °F (36.7 °C)  Pulse:  [77-95] 77  Resp:  [16-20] 20  SpO2:  [93 %-99 %] 96 %  BP: (111-146)/(61-81) 111/65     Weight: 59 kg (130 lb 0.1 oz)  Body mass index is 20.36 kg/m².    Intake/Output Summary (Last 24 hours) at 1/6/2024 1309  Last data filed at 1/6/2024 0645  Gross per 24 hour   Intake 1060 ml   Output 1175 ml   Net -115 ml         Physical Exam  Vitals reviewed.   HENT:      Head: Normocephalic.   Cardiovascular:      Rate and Rhythm: Normal rate.   Pulmonary:      Effort: Pulmonary effort is normal.      Breath sounds: Normal breath sounds.   Musculoskeletal:         General: Swelling (SIVAN thigh), tenderness and signs of injury present.      Comments: Left leg decreased ROM   Skin:     General: Skin is warm.   Neurological:      Mental Status: He is alert and oriented to person, place, and time.   Psychiatric:         Mood and Affect: Mood normal.         Behavior: Behavior normal.             Significant Labs: All pertinent labs within the past 24 hours have been reviewed.  BMP:   Recent Labs   Lab 01/06/24  0552   *  118*     136   K 3.6  3.6      105   CO2 21*  21*   BUN 9  9   CREATININE 0.7  0.7   CALCIUM 8.4*  8.4*     CBC:   Recent Labs   Lab 01/04/24  1738 01/06/24  0552   WBC 9.69 10.18  10.18   HGB 11.2* 8.5*  8.5*   HCT 33.9* 25.7*  25.7*    136*  136*     CMP:   Recent Labs   Lab 01/04/24  1738 01/06/24  0552   * 136  136   K 3.6 3.6  3.6    105  105   CO2 18* 21*  21*   GLU 91 118*  118*   BUN 5* 9  9   CREATININE 0.6 0.7  0.7   CALCIUM 9.1 8.4*  8.4*   PROT 10.1*  --    ALBUMIN 3.1*  --    BILITOT 0.4  --    ALKPHOS 84  --    AST 99*  --    ALT 34  --    ANIONGAP 13 10  10       Significant Imaging: I have reviewed all pertinent imaging results/findings within the past 24 hours.  X-Ray Hip 1 View Left (with Pelvis when performed)  Narrative: EXAMINATION:  XR HIP 1 VIEW LEFT (WITH PELVIS WHEN PERFORMED)    CLINICAL HISTORY:  Status post ORIF left femoral neck fracture;    TECHNIQUE:  XR HIP 1 VIEW LEFT (WITH PELVIS WHEN PERFORMED)    COMPARISON:  01/04/2024    FINDINGS:  Patient is status post screw and plate fixation of a transcervical left femoral neck fracture with anatomic alignment.  Postsurgical skin staples are seen laterally.  Impression: See above    Electronically signed by: Roldan Ferrell Jr  Date:    01/05/2024  Time:    16:08  SURG FL Surgery Fluoro Usage  See OP Notes for results.     IMPRESSION: See OP Notes for results.     This procedure was auto-finalized by: Virtual Radiologist

## 2024-01-06 NOTE — HOSPITAL COURSE
Patient had a left ORIF on 1/5.  He was able to ambulate with therapy on 1/6 and his pain is controlled.  Started on Xarelto for 21 days for DVT prevention per orthopedic recommendations.  PT and OT recommending high-intensity therapy, patient is agreeable to rehab placement however insurance denied so he opted to go home with [facility]. Stable for discharge with xarelto to complete 21d DVT prevention, hydrocodone for pain PRN, PCP fu for staples out in 2 weeks post op and Ortho fu 4 weeks post op. Non-weight bearing LLE x 4 weeks post op. Return precautions discussed, no further questions at discharge

## 2024-01-06 NOTE — PLAN OF CARE
AAOX4. VSS. Admission and 4 eyes on skin complete. Catheter care provided. Pain controlled. Bed locked in lowest position, call light in reach, and bed alarm set. Instructed to call staff for mobility. No falls or injuries on shift. Safety maintained throughout shift. Admit to floor around 1745.       Problem: Adult Inpatient Plan of Care  Goal: Plan of Care Review  Outcome: Ongoing, Progressing  Goal: Patient-Specific Goal (Individualized)  Outcome: Ongoing, Progressing  Goal: Absence of Hospital-Acquired Illness or Injury  Outcome: Ongoing, Progressing  Goal: Optimal Comfort and Wellbeing  Outcome: Ongoing, Progressing  Goal: Readiness for Transition of Care  Outcome: Ongoing, Progressing     Problem: Infection  Goal: Absence of Infection Signs and Symptoms  Outcome: Ongoing, Progressing

## 2024-01-06 NOTE — NURSING
Nurses Note -- 4 Eyes      1/5/2024   6:27 PM      Skin assessed during: Admit      [x] No Altered Skin Integrity Present    []Prevention Measures Documented      [] Yes- Altered Skin Integrity Present or Discovered   [] LDA Added if Not in Epic (Describe Wound)   [] New Altered Skin Integrity was Present on Admit and Documented in LDA   [] Wound Image Taken    Wound Care Consulted? No    Attending Nurse:  Luna ZAIDI LPN     Second RN/Staff Member:   Kwame CAMPOS RN     Incision to left hip from surgery. No open wounds. Skin intact

## 2024-01-06 NOTE — ASSESSMENT & PLAN NOTE
POST OP day 1: Left ORIF  Lovenox started for DVT prophylaxis  Left hip xray shows acute subcapital versus transcervical left femoral neck fracture  Ortho Following  PTOT working with patient

## 2024-01-06 NOTE — PT/OT/SLP EVAL
Physical Therapy Evaluation    Patient Name:  Damien Sanchez   MRN:  [id number]    Recommendations:     Discharge Recommendations: High Intensity Therapy (if unable high, moderate recommended)   Discharge Equipment Recommendations: bath bench, bedside commode, wheelchair   Barriers to discharge: Inaccessible home and Decreased caregiver support    Assessment:     Damien Sanchez is a 58 y.o. male admitted with a medical diagnosis of Closed fracture of neck of left femur.  He presents with the following impairments/functional limitations: weakness, gait instability, impaired balance, decreased lower extremity function, pain, decreased ROM, orthopedic precautions .    Pt aware of his precautions (LLE NWB). Bed mobility was min A for LLE, sit<>stand with RW and CGA, 3 trials x ambulated 10 ft with RW LLE NWB, but any further. Unable to ambulate further than that at this time and maintain precautions. He would most benefit from high level rehab, if not moderate would be appropriate, too. He has steps to get into his home and no real support or assist.     Prior to admission pt was modified independent with mobility and self-care and there is expectation of returning to prior level of function to maintain independence avoiding readmission. Pt is at high risk of unplanned readmission due to fall risk and lack of 24 hour caregiver in prior setting. The lower level of care cannot provide total interdisciplinary approach needed. Pt is able to tolerate 3 hours of daily therapy. Pt is pleasant and motivated to return to prior level of function.      Rehab Prognosis: Good; patient would benefit from acute skilled PT services to address these deficits and reach maximum level of function.    Recent Surgery: Procedure(s) (LRB):  ORIF, HIP (Left) 1 Day Post-Op    Plan:     During this hospitalization, patient to be seen 5 x/week to address the identified rehab impairments via gait training, therapeutic activities, therapeutic exercises  and progress toward the following goals:    Plan of Care Expires:  01/26/24    Subjective     Chief Complaint: LEFT HIP PAIN  Patient/Family Comments/goals: be able to walk again  Pain/Comfort:  Pain Rating 1: 5/10  Location - Side 1: Left  Location - Orientation 1: generalized  Location 1: hip  Pain Addressed 1: Reposition, Distraction  Pain Rating Post-Intervention 1: 0/10    Patients cultural, spiritual, Nondenominational conflicts given the current situation: yes    Living Environment:  Pt lives alone (with roommate sometimes who works at night) in a 1 story house with 3 steps to enter and wife bilateral handrail(s)   Pt has a tub/shower with standard toilet   Prior level of function:  Ambulation: indep  Falls: yes reason for admit    Prior to admission, patients level of function was indep.  Equipment used at home: none.  DME owned (not currently used): rolling walker.  Upon discharge, patient will have assistance from no one steady.    Objective:     Communicated with nurse prior to session.  Patient found HOB elevated with peripheral IV  upon PT entry to room.    General Precautions: Standard, fall  Orthopedic Precautions:LLE non weight bearing   Braces: N/A  Respiratory Status: Room air    Exams:  Cognitive Exam:  Patient is oriented to Person, Place, Time, and Situation  LLE ROM: Deficits: 80 deg hip flexion  LLE Strength: not tested but 2+/5    Functional Mobility: LLE NWB  Bed Mobility:     Supine to Sit: minimum assistance  Sit to Supine: minimum assistance  Transfers:     Sit to Stand:  contact guard assistance with rolling walker LLE NWB  Bed to Chair: stand by assistance with  rolling walker  using  Step Transfer LLE NWB  Gait: 3 trials x 10 ft with RW and LLE NWB      AM-PAC 6 CLICK MOBILITY  Total Score:19       Treatment & Education:  PROM L HIP FLEXION SUPINE  QUAD SETS 10x  Heel slides 10x    Patient left up in chair with all lines intact, call button in reach, and nurse notified.    GOALS:    Multidisciplinary Problems       Physical Therapy Goals          Problem: Physical Therapy    Goal Priority Disciplines Outcome Goal Variances Interventions   Physical Therapy Goal     PT, PT/OT Ongoing, Progressing     Description: Patient will increase functional independence with mobility by performin. Sit<>supine with indep.  2. Sit<>stand using indep and RW, LLE NWB.  3. Gait x 100 ft using RW and  MOD I, LLE NWB.  4. Ascend/descend 3 stair(s) using  handrail(s) and LLE NWB.                             History:     History reviewed. No pertinent past medical history.    History reviewed. No pertinent surgical history.    Time Tracking:     PT Received On: 24  PT Start Time: 825     PT Stop Time: 0855  PT Total Time (min): 30 min     Billable Minutes: Evaluation 10, Gait Training 10, and Therapeutic Exercise 10      2024

## 2024-01-07 LAB
ALBUMIN SERPL BCP-MCNC: 2.6 G/DL (ref 3.5–5.2)
ALP SERPL-CCNC: 62 U/L (ref 55–135)
ALT SERPL W/O P-5'-P-CCNC: 24 U/L (ref 10–44)
ANION GAP SERPL CALC-SCNC: 9 MMOL/L (ref 8–16)
AST SERPL-CCNC: 55 U/L (ref 10–40)
BILIRUB SERPL-MCNC: 0.4 MG/DL (ref 0.1–1)
BUN SERPL-MCNC: 5 MG/DL (ref 6–20)
CALCIUM SERPL-MCNC: 8.1 MG/DL (ref 8.7–10.5)
CHLORIDE SERPL-SCNC: 104 MMOL/L (ref 95–110)
CO2 SERPL-SCNC: 22 MMOL/L (ref 23–29)
CREAT SERPL-MCNC: 0.6 MG/DL (ref 0.5–1.4)
ERYTHROCYTE [DISTWIDTH] IN BLOOD BY AUTOMATED COUNT: 14.9 % (ref 11.5–14.5)
EST. GFR  (NO RACE VARIABLE): >60 ML/MIN/1.73 M^2
GLUCOSE SERPL-MCNC: 91 MG/DL (ref 70–110)
HCT VFR BLD AUTO: 25.3 % (ref 40–54)
HGB BLD-MCNC: 8.2 G/DL (ref 14–18)
MCH RBC QN AUTO: 32.5 PG (ref 27–31)
MCHC RBC AUTO-ENTMCNC: 32.4 G/DL (ref 32–36)
MCV RBC AUTO: 100 FL (ref 82–98)
PLATELET # BLD AUTO: 144 K/UL (ref 150–450)
PMV BLD AUTO: 10.1 FL (ref 9.2–12.9)
POTASSIUM SERPL-SCNC: 3.5 MMOL/L (ref 3.5–5.1)
PROT SERPL-MCNC: 8.1 G/DL (ref 6–8.4)
RBC # BLD AUTO: 2.52 M/UL (ref 4.6–6.2)
SODIUM SERPL-SCNC: 135 MMOL/L (ref 136–145)
WBC # BLD AUTO: 7.39 K/UL (ref 3.9–12.7)

## 2024-01-07 PROCEDURE — 63600175 PHARM REV CODE 636 W HCPCS: Performed by: NURSE PRACTITIONER

## 2024-01-07 PROCEDURE — 85027 COMPLETE CBC AUTOMATED: CPT | Performed by: NURSE PRACTITIONER

## 2024-01-07 PROCEDURE — 97530 THERAPEUTIC ACTIVITIES: CPT

## 2024-01-07 PROCEDURE — 94761 N-INVAS EAR/PLS OXIMETRY MLT: CPT

## 2024-01-07 PROCEDURE — 25000003 PHARM REV CODE 250: Performed by: ORTHOPAEDIC SURGERY

## 2024-01-07 PROCEDURE — 80053 COMPREHEN METABOLIC PANEL: CPT | Performed by: NURSE PRACTITIONER

## 2024-01-07 PROCEDURE — 36415 COLL VENOUS BLD VENIPUNCTURE: CPT | Performed by: NURSE PRACTITIONER

## 2024-01-07 PROCEDURE — 97116 GAIT TRAINING THERAPY: CPT

## 2024-01-07 PROCEDURE — 11000001 HC ACUTE MED/SURG PRIVATE ROOM

## 2024-01-07 RX ADMIN — AMLODIPINE BESYLATE 10 MG: 5 TABLET ORAL at 09:01

## 2024-01-07 RX ADMIN — THERA TABS 1 TABLET: TAB at 09:01

## 2024-01-07 RX ADMIN — HYDROCODONE BITARTRATE AND ACETAMINOPHEN 1 TABLET: 5; 325 TABLET ORAL at 10:01

## 2024-01-07 RX ADMIN — HYDROCODONE BITARTRATE AND ACETAMINOPHEN 1 TABLET: 5; 325 TABLET ORAL at 06:01

## 2024-01-07 RX ADMIN — ATORVASTATIN CALCIUM 40 MG: 20 TABLET, FILM COATED ORAL at 09:01

## 2024-01-07 RX ADMIN — POLYETHYLENE GLYCOL 3350 17 G: 17 POWDER, FOR SOLUTION ORAL at 09:01

## 2024-01-07 RX ADMIN — RILPIVIRINE HYDROCHLORIDE 25 MG: 25 TABLET, FILM COATED ORAL at 06:01

## 2024-01-07 RX ADMIN — Medication 100 MG: at 09:01

## 2024-01-07 RX ADMIN — FOLIC ACID 1 MG: 1 TABLET ORAL at 09:01

## 2024-01-07 RX ADMIN — HYDROCODONE BITARTRATE AND ACETAMINOPHEN 1 TABLET: 5; 325 TABLET ORAL at 05:01

## 2024-01-07 RX ADMIN — ENOXAPARIN SODIUM 40 MG: 40 INJECTION SUBCUTANEOUS at 05:01

## 2024-01-07 RX ADMIN — EMTRICITABINE AND TENOFOVIR ALAFENAMIDE 1 TABLET: 200; 25 TABLET ORAL at 06:01

## 2024-01-07 RX ADMIN — HYDROCODONE BITARTRATE AND ACETAMINOPHEN 1 TABLET: 10; 325 TABLET ORAL at 09:01

## 2024-01-07 NOTE — PROGRESS NOTES
POD#2 s/p ORIF left femoral neck fx  AF  8.5/25.7  - reinforced need for NWB LLE x 4 weeks post op  - staples out at 2 weeks post op  - clinic f/u 4 weeks post-op

## 2024-01-07 NOTE — PLAN OF CARE
Problem: Adult Inpatient Plan of Care  Goal: Plan of Care Review  Outcome: Ongoing, Progressing  Goal: Absence of Hospital-Acquired Illness or Injury  Outcome: Ongoing, Progressing  Goal: Optimal Comfort and Wellbeing  Outcome: Ongoing, Progressing     Problem: Infection  Goal: Absence of Infection Signs and Symptoms  Outcome: Ongoing, Progressing     Problem: Fall Injury Risk  Goal: Absence of Fall and Fall-Related Injury  Outcome: Ongoing, Progressing     Problem: Pain (Surgery Nonspecified)  Goal: Acceptable Pain Control  Outcome: Ongoing, Progressing   POC reviewed with pt who verbalized understanding. AAOx4. VSS on RA. Remains free of falls and injury. Left hip dressing in place. Pain managed with PRN meds per MAR. Urinal at bedside. Resting between care. Call light in reach and rounds made for safety.

## 2024-01-07 NOTE — ASSESSMENT & PLAN NOTE
POST OP day 1: Left ORIF  Lovenox started for DVT prophylaxis  Left hip xray shows acute subcapital versus transcervical left femoral neck fracture  Ortho Following  PTOT working with patient  Prefers to go home with

## 2024-01-07 NOTE — PT/OT/SLP PROGRESS
Physical Therapy Treatment    Patient Name:  Damien Sanchez   MRN:  [id number]    Recommendations:     Discharge Recommendations: High Intensity Therapy  Discharge Equipment Recommendations: bedside commode, bath bench, walker, rolling, wheelchair (.rollingwalker)  Barriers to discharge: Inaccessible home and Decreased caregiver support    Assessment:     Damien Sanchez is a 58 y.o. male admitted with a medical diagnosis of Closed fracture of neck of left femur.  He presents with the following impairments/functional limitations: weakness, impaired endurance, impaired functional mobility, gait instability, impaired balance, decreased coordination, decreased lower extremity function, decreased safety awareness, decreased ROM, impaired skin, edema, impaired joint extensibility, orthopedic precautions. Pt required Min assist to transfer supine<>sit.  He also required Min assist, a RW and verbal cues to remain NWB on the L LE with sit<>stand transfers.  Pt required CGA, a RW and verbal cues to remain NWB on the L LE to ambulate 20 ft x 2.  .    Rehab Prognosis: Good; patient would benefit from acute skilled PT services to address these deficits and reach maximum level of function.    Recent Surgery: Procedure(s) (LRB):  ORIF, HIP (Left) 2 Days Post-Op    Plan:     During this hospitalization, patient to be seen 5 x/week to address the identified rehab impairments via gait training, therapeutic activities, therapeutic exercises, neuromuscular re-education and progress toward the following goals:    Plan of Care Expires:  01/26/24    Subjective     Chief Complaint: Pt with no c/o pain today  Patient/Family Comments/goals: Agreeable to treatment  Pain/Comfort:  Pain Rating 1: 0/10      Objective:     Communicated with Cassandra - Nurse prior to session.  Patient found HOB elevated with bed alarm, peripheral IV upon PT entry to room.     General Precautions: Standard, fall, seizure  Orthopedic Precautions: LLE non weight  bearing  Braces: N/A  Respiratory Status: Room air     Functional Mobility:  Bed Mobility:     Supine to Sit: minimum assistance  Sit to Supine: minimum assistance  Transfers:     Sit to Stand:  minimum assistance with rolling walker  Gait: Pt required verbal cues to remain NWB on the L LE with ambulating 20 ft x 2 with SBA/CGA and a RW, NWB on the L LE.      AM-PAC 6 CLICK MOBILITY  Turning over in bed (including adjusting bedclothes, sheets and blankets)?: 4  Sitting down on and standing up from a chair with arms (e.g., wheelchair, bedside commode, etc.): 3  Moving from lying on back to sitting on the side of the bed?: 3  Moving to and from a bed to a chair (including a wheelchair)?: 3  Need to walk in hospital room?: 3  Climbing 3-5 steps with a railing?: 2  Basic Mobility Total Score: 18       Treatment & Education:  Plan of Care  Safety with transfers and ambulation  NWB on L LE status with sit<>stand and transfers    Patient left HOB elevated with all lines intact, call button in reach, bed alarm on, and Cassandra - Nurse notified..    GOALS:   Multidisciplinary Problems       Physical Therapy Goals          Problem: Physical Therapy    Goal Priority Disciplines Outcome Goal Variances Interventions   Physical Therapy Goal     PT, PT/OT Ongoing, Progressing     Description: Patient will increase functional independence with mobility by performin. Sit<>supine with indep.  2. Sit<>stand using indep and RW, LLE NWB.  3. Gait x 100 ft using RW and  MOD I, LLE NWB.  4. Ascend/descend 3 stair(s) using  handrail(s) and LLE NWB.                             Time Tracking:     PT Received On: 24  PT Start Time: 1316     PT Stop Time: 1340  PT Total Time (min): 24 min     Billable Minutes: Gait Training 12 and Therapeutic Activity 12    Treatment Type: Treatment  PT/PTA: PT     Number of PTA visits since last PT visit: 0     2024

## 2024-01-07 NOTE — PLAN OF CARE
Problem: Infection  Goal: Absence of Infection Signs and Symptoms  Outcome: Ongoing, Progressing     Problem: Fall Injury Risk  Goal: Absence of Fall and Fall-Related Injury  Outcome: Ongoing, Progressing     Problem: Infection (Surgery Nonspecified)  Goal: Absence of Infection Signs and Symptoms  Outcome: Ongoing, Progressing

## 2024-01-07 NOTE — SUBJECTIVE & OBJECTIVE
Interval History: Patient feeling well today. His pain is controlled. He denies HA, tremors, agitation, and ETOH withdrawals.      Review of Systems   Constitutional:  Negative for activity change.   HENT:  Negative for congestion.    Eyes:  Negative for visual disturbance.   Respiratory:  Negative for apnea and shortness of breath.    Gastrointestinal:  Negative for diarrhea and vomiting.   Genitourinary:  Negative for difficulty urinating.   Musculoskeletal:  Positive for arthralgias.   Neurological:  Negative for dizziness and weakness.   Psychiatric/Behavioral:  Negative for agitation and behavioral problems.      Objective:     Vital Signs (Most Recent):  Temp: 99.5 °F (37.5 °C) (01/07/24 0753)  Pulse: 94 (01/07/24 0753)  Resp: 18 (01/07/24 1005)  BP: 106/63 (01/07/24 0753)  SpO2: (!) 94 % (01/07/24 0753) Vital Signs (24h Range):  Temp:  [98.1 °F (36.7 °C)-99.5 °F (37.5 °C)] 99.5 °F (37.5 °C)  Pulse:  [77-94] 94  Resp:  [16-20] 18  SpO2:  [92 %-98 %] 94 %  BP: (106-123)/(60-75) 106/63     Weight: 59 kg (130 lb 0.1 oz)  Body mass index is 20.36 kg/m².    Intake/Output Summary (Last 24 hours) at 1/7/2024 1058  Last data filed at 1/7/2024 0453  Gross per 24 hour   Intake 240 ml   Output 1550 ml   Net -1310 ml         Physical Exam  Vitals reviewed.   Cardiovascular:      Rate and Rhythm: Normal rate.   Pulmonary:      Effort: Pulmonary effort is normal.      Breath sounds: Normal breath sounds.   Abdominal:      General: Abdomen is flat. There is no distension.      Palpations: Abdomen is soft.      Tenderness: There is no abdominal tenderness.   Musculoskeletal:         General: Swelling and tenderness present. Normal range of motion.      Comments: Left femur fracture with ORIF/swelling at surgicla site. No bruising. Pulses/sensation intact   Skin:     General: Skin is warm.   Neurological:      General: No focal deficit present.      Mental Status: He is alert and oriented to person, place, and time.              Significant Labs: All pertinent labs within the past 24 hours have been reviewed.  BMP:   Recent Labs   Lab 01/07/24  0450   GLU 91   *   K 3.5      CO2 22*   BUN 5*   CREATININE 0.6   CALCIUM 8.1*     CBC:   Recent Labs   Lab 01/06/24  0552 01/07/24  0450   WBC 10.18  10.18 7.39   HGB 8.5*  8.5* 8.2*   HCT 25.7*  25.7* 25.3*   *  136* 144*     CMP:   Recent Labs   Lab 01/06/24  0552 01/07/24  0450     136 135*   K 3.6  3.6 3.5     105 104   CO2 21*  21* 22*   *  118* 91   BUN 9  9 5*   CREATININE 0.7  0.7 0.6   CALCIUM 8.4*  8.4* 8.1*   PROT  --  8.1   ALBUMIN  --  2.6*   BILITOT  --  0.4   ALKPHOS  --  62   AST  --  55*   ALT  --  24   ANIONGAP 10  10 9       Significant Imaging: I have reviewed all pertinent imaging results/findings within the past 24 hours.  X-Ray Hip 1 View Left (with Pelvis when performed)  Narrative: EXAMINATION:  XR HIP 1 VIEW LEFT (WITH PELVIS WHEN PERFORMED)    CLINICAL HISTORY:  Status post ORIF left femoral neck fracture;    TECHNIQUE:  XR HIP 1 VIEW LEFT (WITH PELVIS WHEN PERFORMED)    COMPARISON:  01/04/2024    FINDINGS:  Patient is status post screw and plate fixation of a transcervical left femoral neck fracture with anatomic alignment.  Postsurgical skin staples are seen laterally.  Impression: See above    Electronically signed by: Roldan Ferrell Jr  Date:    01/05/2024  Time:    16:08  SURG [place] Surgery Fluoro Usage  See OP Notes for results.     IMPRESSION: See OP Notes for results.     This procedure was auto-finalized by: Virtual Radiologist

## 2024-01-08 LAB
CD3+CD4+ CELLS # BLD: 683 CELLS/UL (ref 300–1400)
CD3+CD4+ CELLS NFR BLD: 38.7 % (ref 28–57)
ERYTHROCYTE [DISTWIDTH] IN BLOOD BY AUTOMATED COUNT: 14.7 % (ref 11.5–14.5)
HCT VFR BLD AUTO: 26 % (ref 40–54)
HGB BLD-MCNC: 8.5 G/DL (ref 14–18)
HIV1 RNA # SERPL NAA+PROBE: NOT DETECTED COPIES/ML
HIV1 RNA SERPL QL NAA+PROBE: NOT DETECTED
MCH RBC QN AUTO: 32.6 PG (ref 27–31)
MCHC RBC AUTO-ENTMCNC: 32.7 G/DL (ref 32–36)
MCV RBC AUTO: 100 FL (ref 82–98)
PLATELET # BLD AUTO: 168 K/UL (ref 150–450)
PMV BLD AUTO: 10.2 FL (ref 9.2–12.9)
RBC # BLD AUTO: 2.61 M/UL (ref 4.6–6.2)
WBC # BLD AUTO: 7.38 K/UL (ref 3.9–12.7)

## 2024-01-08 PROCEDURE — 11000001 HC ACUTE MED/SURG PRIVATE ROOM

## 2024-01-08 PROCEDURE — 25000003 PHARM REV CODE 250: Performed by: ORTHOPAEDIC SURGERY

## 2024-01-08 PROCEDURE — 85027 COMPLETE CBC AUTOMATED: CPT | Performed by: NURSE PRACTITIONER

## 2024-01-08 PROCEDURE — 97116 GAIT TRAINING THERAPY: CPT | Mod: CQ

## 2024-01-08 PROCEDURE — 25000003 PHARM REV CODE 250: Performed by: PHYSICIAN ASSISTANT

## 2024-01-08 PROCEDURE — 97110 THERAPEUTIC EXERCISES: CPT | Mod: CO

## 2024-01-08 PROCEDURE — 36415 COLL VENOUS BLD VENIPUNCTURE: CPT | Performed by: NURSE PRACTITIONER

## 2024-01-08 PROCEDURE — 97535 SELF CARE MNGMENT TRAINING: CPT | Mod: CO

## 2024-01-08 PROCEDURE — 94761 N-INVAS EAR/PLS OXIMETRY MLT: CPT

## 2024-01-08 RX ORDER — BISACODYL 5 MG
5 TABLET, DELAYED RELEASE (ENTERIC COATED) ORAL ONCE
Status: COMPLETED | OUTPATIENT
Start: 2024-01-08 | End: 2024-01-08

## 2024-01-08 RX ORDER — POLYETHYLENE GLYCOL 3350 17 G/17G
17 POWDER, FOR SOLUTION ORAL DAILY
Refills: 0
Start: 2024-01-09

## 2024-01-08 RX ADMIN — Medication 100 MG: at 08:01

## 2024-01-08 RX ADMIN — EMTRICITABINE AND TENOFOVIR ALAFENAMIDE 1 TABLET: 200; 25 TABLET ORAL at 06:01

## 2024-01-08 RX ADMIN — ATORVASTATIN CALCIUM 40 MG: 20 TABLET, FILM COATED ORAL at 09:01

## 2024-01-08 RX ADMIN — AMLODIPINE BESYLATE 10 MG: 5 TABLET ORAL at 08:01

## 2024-01-08 RX ADMIN — BISACODYL 5 MG: 5 TABLET, COATED ORAL at 12:01

## 2024-01-08 RX ADMIN — RILPIVIRINE HYDROCHLORIDE 25 MG: 25 TABLET, FILM COATED ORAL at 06:01

## 2024-01-08 RX ADMIN — RIVAROXABAN 10 MG: 10 TABLET, FILM COATED ORAL at 04:01

## 2024-01-08 RX ADMIN — POLYETHYLENE GLYCOL 3350 17 G: 17 POWDER, FOR SOLUTION ORAL at 08:01

## 2024-01-08 RX ADMIN — THERA TABS 1 TABLET: TAB at 08:01

## 2024-01-08 RX ADMIN — MELATONIN TAB 3 MG 6 MG: 3 TAB at 09:01

## 2024-01-08 RX ADMIN — FOLIC ACID 1 MG: 1 TABLET ORAL at 08:01

## 2024-01-08 RX ADMIN — HYDROCODONE BITARTRATE AND ACETAMINOPHEN 1 TABLET: 5; 325 TABLET ORAL at 06:01

## 2024-01-08 NOTE — PLAN OF CARE
[facility]    FACILITY TRANSFER ORDERS      Patient Name: Damien Sanchez  YOB: 1965    PCP: No, Primary Doctor   PCP Address: None  PCP Phone Number: None  PCP Fax: None    Encounter Date: 01/09/2024    Admit to: [facility]    Vital Signs:  Routine    Diagnoses:   Active Hospital Problems    Diagnosis  POA    *Closed fracture of neck of left femur [S72.002A]  Yes     Priority: 1 - High    Alcohol dependence [F10.20]  Yes     Priority: 2     Alcoholic cirrhosis of liver without ascites [K70.30]  Yes     Formatting of this note might be different from the original.   Added automatically from request for surgery [id number]      HLD (hyperlipidemia) [E78.5]  Yes    Hypertension [I10]  Yes     dx update  Formatting of this note might be different from the original.   dx update      Human immunodeficiency virus (HIV) disease [B20]  Yes     dx update  Formatting of this note might be different from the original.   dx update        Resolved Hospital Problems   No resolved problems to display.       Allergies:  Review of patient's allergies indicates:   Allergen Reactions    Lisinopril Other (See Comments)     Angioedema^    Angioedema^   Angioedema^       Diet: regular diet    Activities: NWB LLE x 4 weeks post op     Goals of Care Treatment Preferences:  Code Status: Full Code      Nursing: per facility protocol     Labs: per facility protocol, CBC BID    CONSULTS:    Physical Therapy to evaluate and treat.  and Occupational Therapy to evaluate and treat.    MISCELLANEOUS CARE:  Routine Skin for Bedridden Patients: Apply moisture barrier cream to all skin folds and wet areas in perineal area daily and after baths and all bowel movements.  staples out at 2 weeks post op ( ORIF done 1/5)  WOUND CARE ORDERS  None    Medications: Review discharge medications with patient and family and provide education.      Current Discharge Medication List        START taking these medications    Details    polyethylene glycol (GLYCOLAX) 17 gram PwPk Take 17 g by mouth once daily.  Refills: 0      rivaroxaban (XARELTO) 10 mg Tab Take 1 tablet (10 mg total) by mouth daily with dinner or evening meal. for 20 days  Qty: 20 tablet, Refills: 0           CONTINUE these medications which have NOT CHANGED    Details   amLODIPine (NORVASC) 10 MG tablet Take 10 mg by mouth once daily.      atorvastatin (LIPITOR) 40 MG tablet Take 40 mg by mouth.      ODEFSEY 200-25-25 mg Tab Take 1 tablet by mouth every morning.      OYSTER SHELL CALCIUM-VIT D3 500 mg-5 mcg (200 unit) per tablet Take 1 tablet by mouth 2 (two) times daily.      ketoconazole (NIZORAL) 2 % cream Apply 2 % topically once daily.                Immunizations Administered as of 1/8/2024       No immunizations on file.            unknown    Some patients may experience side effects after vaccination.  These may include fever, headache, muscle or joint aches.  Most symptoms resolve with 24-48 hours and do not require urgent medical evaluation unless they persist for more than 72 hours or symptoms are concerning for an unrelated medical condition.          _________________________________  Rubia Silveira PA-C  01/08/2024

## 2024-01-08 NOTE — PT/OT/SLP PROGRESS
Occupational Therapy   Treatment    Name: Damien Sanchez  MRN: [id number]  Admitting Diagnosis:  Closed fracture of neck of left femur  3 Days Post-Op    Recommendations:     Discharge Recommendations: High Intensity Therapy  Discharge Equipment Recommendations:  bedside commode, bath bench, walker, rolling  Barriers to discharge:  Inaccessible home environment, Decreased caregiver support (current functional level)    Assessment:     Damien Sanchez is a 58 y.o. male with a medical diagnosis of Closed fracture of neck of left femur.  He presents with no pain. Performance deficits affecting function are weakness, impaired endurance, impaired self care skills, impaired functional mobility, gait instability, impaired balance, decreased coordination, decreased lower extremity function, decreased safety awareness, decreased ROM. Patient agreeable to therapy. Patient with CGA, RW with LLE NWB for functional mobility - patient able top adhere to precautions and no LOB observed. Patient performed UB exercises seated to increase strength and endurance needed for functional transfers and ADLs.     The mobility limitation cannot be sufficiently resolved by the use of a cane.   Patient's functional mobility deficit can be sufficiently resolved with the use of a rolling walker. Patient's mobility limitation significantly impairs their ability to participate in one of more activities of daily living. The use of a rolling walker will significantly improve the patient's ability to participate in MRADLS and the patient will use it on regular basis in the home.     Prior to onset of impairments pt was independent with mobility and self-care and there is expectation of returning to prior level of function to maintain independence. Pt's clinical condition meets full high intensive therapy criteria. High intensive therapy will provide to total interdisciplinary treatment approach needed. Pt is at high risk of unplanned readmission due to  fall risk and inaccessible home. Lower intensive therapy cannot provide total interdisciplinary approach needed.     Rehab Prognosis:  Good; patient would benefit from acute skilled OT services to address these deficits and reach maximum level of function.       Plan:     Patient to be seen 5 x/week to address the above listed problems via self-care/home management, therapeutic activities, therapeutic exercises  Plan of Care Expires: 01/20/24  Plan of Care Reviewed with: patient    Subjective     Chief Complaint: none stated   Patient/Family Comments/goals: agreeable to participate in therapy for OT intervention   Pain/Comfort:  Pain Rating 1: 0/10    Objective:     Communicated with: RN prior to session.  Patient found supine with bed alarm, peripheral IV upon OT entry to room.    General Precautions: Standard, fall, seizure    Orthopedic Precautions:LLE non weight bearing  Braces: N/A  Respiratory Status: Room air     Occupational Performance:     Bed Mobility:    Supine > Sit: Min A with LLE      Functional Mobility/Transfers:  Sit <> Stand: CGA   Functional Mobility: CGA, RW with NWB LLE   BSC/Chair transfer: Min A - cues for steady descend   No toileting performed     Activities of Daily Living:  UB Dressing: CGA to don/doff gown seated   Feeding: Set Up    UB Thera-Ex  Overhead arm raises   Arm forward/backward circles   Elbow curls   Chest press   Shoulder forward and backward shrugs       AMPA 6 Click ADL: 16    Treatment & Education:  OT role, plan of care, progression of goals, importance of continued OOB activity, ADL/functional transfer and mobility retraining, discharge recommendation, call don't fall, safety precautions, fall prevention, UB thera-ex    Patient left up in chair with all lines intact, call button in reach, and RN notified    GOALS:   Multidisciplinary Problems       Occupational Therapy Goals          Problem: Occupational Therapy    Goal Priority Disciplines Outcome Interventions    Occupational Therapy Goal     OT, PT/OT Ongoing, Progressing    Description: Goals to be met by: 1/20/2024     Patient will increase functional independence with ADLs by performing:    UE Dressing with Set-up Assistance.  LE Dressing with Contact Guard Assistance.  Grooming while seated at sink with Set-up Assistance.  Toileting from bedside commode with Stand-by Assistance for hygiene and clothing management.   Toilet transfer to bedside commode with Stand-by Assistance.                         Time Tracking:     OT Date of Treatment: 01/08/24  OT Start Time: 0945  OT Stop Time: 1008  OT Total Time (min): 23 min    Billable Minutes:Self Care/Home Management  13 min  Therapeutic Exercise 10 min    OT/NAVI: NAVI     Number of NAVI visits since last OT visit: 1 1/8/2024

## 2024-01-08 NOTE — PLAN OF CARE
I certify I provided patient choice and a list to the patient/family of CMS rated IRF.  Patient/Family signed Patient's Choice Disclosure Form choosing the following:   [facility]   2. First available     [facility] sent out referrals via careport.

## 2024-01-08 NOTE — PROGRESS NOTES
[facility] Surg [address] Medicine  Progress Note    Patient Name: Damien Sanchez  MRN: [id number]  Patient Class: IP- Inpatient   Admission Date: 1/4/2024  Length of Stay: 3 days  Attending Physician: NKECHI Lam MD  Primary Care Provider: Kendy, Primary Doctor        Subjective:     Principal Problem:Closed fracture of neck of left femur        HPI:  Damien Sanchez is a 58M with HIV on odefsey, HTN and alcohol abuse who presents for hip pain sustained after a fall. Today he was walking to his friend's house to celebrate her birthday when he fell on his left side. Denies hitting his head or losing consciousness. Since the fall he was unable to walk because of left hip pain. He drinks daily, including drinking today. Usually he drinks about 24 beers per day, denies history of alcohol withdrawal.     In ER: hip xray shows left humeral fracture, ortho consulted, IV ketorolac ordered    Overview/Hospital Course:  Patient had a left ORIF on 1/5.  He was able to ambulate with therapy on 1/6 and his pain is controlled.  Started on Xarelto for 21 days for DVT prevention per orthopedic recommendations.  PT and OT recommending high-intensity therapy, patient is agreeable to rehab placement.  He will need orthopedic follow-up outpatient    Interval History:  Appreciate Cindy with ortho clarifying DVT prophylaxis, starting Xarelto for 21 days.  Mr. Sanchez was seen at bedside resting in no apparent distress, reports pain is well-controlled and incision is clean dry and intact.  Discussed PT recommendations for rehab center, he was agreeable    Review of Systems   Constitutional:  Positive for activity change (due to weight-bearing limitations).   HENT:  Negative for congestion.    Respiratory:  Negative for apnea and shortness of breath.    Gastrointestinal:  Negative for abdominal pain, diarrhea and vomiting.   Musculoskeletal:  Positive for arthralgias.   Neurological:  Negative for dizziness and weakness.    Psychiatric/Behavioral:  Negative for agitation.      Objective:     Vital Signs (Most Recent):  Temp: 98.7 °F (37.1 °C) (01/08/24 0753)  Pulse: 86 (01/08/24 0753)  Resp: 16 (01/08/24 0753)  BP: 106/66 (01/08/24 0753)  SpO2: 98 % (01/08/24 0753) Vital Signs (24h Range):  Temp:  [98 °F (36.7 °C)-99.5 °F (37.5 °C)] 98.7 °F (37.1 °C)  Pulse:  [77-92] 86  Resp:  [16-18] 16  SpO2:  [96 %-98 %] 98 %  BP: (106-118)/(61-74) 106/66     Weight: 59 kg (130 lb 0.1 oz)  Body mass index is 20.36 kg/m².    Intake/Output Summary (Last 24 hours) at 1/8/2024 1102  Last data filed at 1/8/2024 0408  Gross per 24 hour   Intake 300 ml   Output 2150 ml   Net -1850 ml         Physical Exam  Vitals reviewed.   Constitutional:       Appearance: Normal appearance.   Cardiovascular:      Rate and Rhythm: Normal rate.   Pulmonary:      Effort: Pulmonary effort is normal. No respiratory distress.   Abdominal:      General: Abdomen is flat. There is no distension.   Musculoskeletal:         General: Swelling and tenderness present.      Comments: Left femur fracture with ORIF/swelling at surgicla site. No bruising. Pulses/sensation intact   Skin:     General: Skin is warm.   Neurological:      General: No focal deficit present.      Mental Status: He is alert.   Psychiatric:         Mood and Affect: Mood normal.         Behavior: Behavior normal.             Significant Labs: All pertinent labs within the past 24 hours have been reviewed.    Significant Imaging: I have reviewed all pertinent imaging results/findings within the past 24 hours.    Assessment/Plan:      * Closed fracture of neck of left femur  Left hip xray shows acute subcapital versus transcervical left femoral neck fracture  Orthopedics consulted, left ORIF done1/5  Nonweightbearing left lower extremity x4 weeks postop   Staples out in 2 weeks postop  Clinic follow-up 4 weeks postop   Xarelto times 21 days for VTE prevention  Pain control  PTOT working with patient and  recommended high-intensity therapy   Mr. Sanchez is agreeable to go to rehab        Alcohol dependence  Drinks about 24 beers per day, last drink afternoon of admission  UDS +opiates/thc and EtOH 147  Start folic acid, thiamine, MVI  CIWA q8H, seizure precautions  Valium PRN  Denies history of alcohol withdrawals  1/5 AM: denies HA, anxiety, sweats, hallucinations   -Remain symptom free of ETOh withdrawals      Hypertension  Stable  Continue home norvasc 10mg daily    Human immunodeficiency virus (HIV) disease  Updated CD4 count pending  Continue home dqodsxaaux-fcbllgsm-ttyavr ala 200-25-25 mg Tab 1 tablet daily (not on formulary, will have to order separately or have someone bring in meds from home)        HLD (hyperlipidemia)  Continue home lipitor        VTE Risk Mitigation (From admission, onward)           Ordered     rivaroxaban tablet 10 mg  With dinner         01/08/24 1101     IP VTE LOW RISK PATIENT  Once         01/04/24 1713                    Discharge Planning   MANUEL: 1/8/2024     Code Status: Full Code   Is the patient medically ready for discharge?:     Reason for patient still in hospital (select all that apply): Patient trending condition  Discharge Plan A: (P) Home                  Rubia Silveira PA-C  Department of Hospital Medicine   [facility] ([address])

## 2024-01-08 NOTE — ASSESSMENT & PLAN NOTE
Left hip xray shows acute subcapital versus transcervical left femoral neck fracture  Orthopedics consulted, left ORIF done1/5  Nonweightbearing left lower extremity x4 weeks postop   Staples out in 2 weeks postop  Clinic follow-up 4 weeks postop   Xarelto times 21 days for VTE prevention  Pain control  PTOT working with patient and recommended high-intensity therapy   Mr. Sanchez is agreeable to go to rehab

## 2024-01-08 NOTE — SUBJECTIVE & OBJECTIVE
Interval History:  Appreciate Cindy with ortho clarifying DVT prophylaxis, starting Xarelto for 21 days.  Mr. Sanchez was seen at bedside resting in no apparent distress, reports pain is well-controlled and incision is clean dry and intact.  Discussed PT recommendations for rehab center, he was agreeable    Review of Systems   Constitutional:  Positive for activity change (due to weight-bearing limitations).   HENT:  Negative for congestion.    Respiratory:  Negative for apnea and shortness of breath.    Gastrointestinal:  Negative for abdominal pain, diarrhea and vomiting.   Musculoskeletal:  Positive for arthralgias.   Neurological:  Negative for dizziness and weakness.   Psychiatric/Behavioral:  Negative for agitation.      Objective:     Vital Signs (Most Recent):  Temp: 98.7 °F (37.1 °C) (01/08/24 0753)  Pulse: 86 (01/08/24 0753)  Resp: 16 (01/08/24 0753)  BP: 106/66 (01/08/24 0753)  SpO2: 98 % (01/08/24 0753) Vital Signs (24h Range):  Temp:  [98 °F (36.7 °C)-99.5 °F (37.5 °C)] 98.7 °F (37.1 °C)  Pulse:  [77-92] 86  Resp:  [16-18] 16  SpO2:  [96 %-98 %] 98 %  BP: (106-118)/(61-74) 106/66     Weight: 59 kg (130 lb 0.1 oz)  Body mass index is 20.36 kg/m².    Intake/Output Summary (Last 24 hours) at 1/8/2024 1102  Last data filed at 1/8/2024 0408  Gross per 24 hour   Intake 300 ml   Output 2150 ml   Net -1850 ml         Physical Exam  Vitals reviewed.   Constitutional:       Appearance: Normal appearance.   Cardiovascular:      Rate and Rhythm: Normal rate.   Pulmonary:      Effort: Pulmonary effort is normal. No respiratory distress.   Abdominal:      General: Abdomen is flat. There is no distension.   Musculoskeletal:         General: Swelling and tenderness present.      Comments: Left femur fracture with ORIF/swelling at surgicla site. No bruising. Pulses/sensation intact   Skin:     General: Skin is warm.   Neurological:      General: No focal deficit present.      Mental Status: He is alert.   Psychiatric:          Mood and Affect: Mood normal.         Behavior: Behavior normal.             Significant Labs: All pertinent labs within the past 24 hours have been reviewed.    Significant Imaging: I have reviewed all pertinent imaging results/findings within the past 24 hours.

## 2024-01-08 NOTE — PT/OT/SLP PROGRESS
Physical Therapy Treatment    Patient Name:  Damien Sanchez   MRN:  [id number]    Recommendations:     Discharge Recommendations: High Intensity Therapy  Discharge Equipment Recommendations: bedside commode, bath bench, walker, rolling, wheelchair (.rollingwalker)  Barriers to discharge: Inaccessible home and Decreased caregiver support    Assessment:     Damien Sanchez is a 58 y.o. male admitted with a medical diagnosis of Closed fracture of neck of left femur.  He presents with the following impairments/functional limitations: weakness, gait instability, impaired balance, impaired endurance, impaired functional mobility, impaired self care skills, decreased coordination, decreased lower extremity function, decreased ROM, decreased safety awareness.    Sit>stand from chair with RW and CGA  Chair>Bed with RW and Malcolm, step transfer, L LE NWB  Amb 35' in room with RW and CGA, L LE NWB, cueing to decrease speed  Sit>supine with CGA  Scoot toward HOB with SBA  Pt understands precautions, req'ing CGA for safety at this time  Rec High Intensity Therapy    Prior to admission pt was modified independent with mobility and self-care and there is expectation of returning to prior level of function to maintain independence avoiding readmission. Pt is at high risk of unplanned readmission due to fall risk and lack of 24 hour caregiver in prior setting. The lower level of care cannot provide total interdisciplinary approach needed. Pt is able to tolerate 3 hours of daily therapy. Pt is pleasant and motivated to return to prior level of function.       Rehab Prognosis: Good; patient would benefit from acute skilled PT services to address these deficits and reach maximum level of function.    Recent Surgery: Procedure(s) (LRB):  ORIF, HIP (Left) 3 Days Post-Op    Plan:     During this hospitalization, patient to be seen 5 x/week to address the identified rehab impairments via gait training, therapeutic activities, therapeutic  exercises, neuromuscular re-education and progress toward the following goals:    Plan of Care Expires:  24    Subjective     Chief Complaint: pt wishes he could WB on LLE instead of hop around  Patient/Family Comments/goals: go to Rehab  Pain/Comfort:  Pain Rating 1: 0/10  Pain Rating Post-Intervention 1: 0/10      Objective:     Communicated with nurse Puente prior to session.  Patient found up in chair with peripheral IV upon PT entry to room.     General Precautions: Standard, fall, seizure  Orthopedic Precautions: LLE non weight bearing  Braces: N/A  Respiratory Status: Room air     Functional Mobility:  Bed Mobility:     Scooting: stand by assistance  Sit to Supine: contact guard assistance  Transfers:     Sit to Stand:  contact guard assistance with rolling walker  Chair to Bed: contact guard assistance with  rolling walker  using  Step Transfer  Gait: 35' in room with RW and CGA, L LE NWB, cueing to decrease speed      AM-PAC 6 CLICK MOBILITY  Turning over in bed (including adjusting bedclothes, sheets and blankets)?: 4  Sitting down on and standing up from a chair with arms (e.g., wheelchair, bedside commode, etc.): 3  Moving from lying on back to sitting on the side of the bed?: 3  Moving to and from a bed to a chair (including a wheelchair)?: 3  Need to walk in hospital room?: 3  Climbing 3-5 steps with a railing?: 2  Basic Mobility Total Score: 18       Treatment & Education:  Gait as noted    Patient left HOB elevated with all lines intact, call button in reach, and bed alarm on..    GOALS:   Multidisciplinary Problems       Physical Therapy Goals          Problem: Physical Therapy    Goal Priority Disciplines Outcome Goal Variances Interventions   Physical Therapy Goal     PT, PT/OT Ongoing, Progressing     Description: Patient will increase functional independence with mobility by performin. Sit<>supine with indep.  2. Sit<>stand using indep and RW, LLE NWB.  3. Gait x 100 ft using RW  and  MOD I, LLE NWB.  4. Ascend/descend 3 stair(s) using  handrail(s) and LLE NWB.                             Time Tracking:     PT Received On: 01/08/24  PT Start Time: 1232     PT Stop Time: 1241  PT Total Time (min): 9 min     Billable Minutes: Gait Training 9    Treatment Type: Treatment  PT/PTA: PTA     Number of PTA visits since last PT visit: 1 01/08/2024

## 2024-01-08 NOTE — PLAN OF CARE
[facility] ([address])      HOME HEALTH ORDERS  FACE TO FACE ENCOUNTER    Patient Name: Damien Sanchez  YOB: 1965    PCP: No, Primary Doctor   PCP Address: None  PCP Phone Number: None  PCP Fax: None    Encounter Date: 1/4/24    Admit to Home Health    Diagnoses:  Active Hospital Problems    Diagnosis  POA    *Closed fracture of neck of left femur [S72.002A]  Yes    Alcoholic cirrhosis of liver without ascites [K70.30]  Yes     Formatting of this note might be different from the original.   Added automatically from request for surgery [id number]      HLD (hyperlipidemia) [E78.5]  Yes    Alcohol dependence [F10.20]  Yes    Hypertension [I10]  Yes     dx update  Formatting of this note might be different from the original.   dx update      Human immunodeficiency virus (HIV) disease [B20]  Yes     dx update  Formatting of this note might be different from the original.   dx update        Resolved Hospital Problems   No resolved problems to display.       Follow Up Appointments:  No future appointments.    Allergies:  Review of patient's allergies indicates:   Allergen Reactions    Lisinopril Other (See Comments)     Angioedema^    Angioedema^   Angioedema^       Medications: Review discharge medications with patient and family and provide education.    Current Facility-Administered Medications   Medication Dose Route Frequency Provider Last Rate Last Admin    acetaminophen tablet 1,000 mg  1,000 mg Oral Q8H PRN Lawrence Cosby MD        acetaminophen tablet 650 mg  650 mg Oral Q4H PRN Lawrence Cosby MD        albuterol-ipratropium 2.5 mg-0.5 mg/3 mL nebulizer solution 3 mL  3 mL Nebulization Q4H PRN Lawrence Cosby MD        aluminum-magnesium hydroxide-simethicone 200-200-20 mg/5 mL suspension 30 mL  30 mL Oral QID PRN Lawrence Cosby MD        amLODIPine tablet 10 mg  10 mg Oral Daily Lawrence Cosby MD   10 mg at 01/07/24 0954    atorvastatin tablet 40 mg  40 mg Oral QHS Harjeet  MD Lawrence   40 mg at 01/06/24 2006    bisacodyL suppository 10 mg  10 mg Rectal Daily PRN Lawrence Cosby MD        dextrose 10% bolus 125 mL 125 mL  12.5 g Intravenous PRN Lawrence Cosby MD        dextrose 10% bolus 250 mL 250 mL  25 g Intravenous PRN Lawrence Cosby MD        diazePAM tablet 5 mg  5 mg Oral Q4H PRN Lawrence Cosby MD        emtricitabine-tenofovir alafen 200-25 mg Tab 1 tablet  1 tablet Oral QAM Lawrence Cosby MD   1 tablet at 01/07/24 0608    enoxaparin injection 40 mg  40 mg Subcutaneous Q24H (prophylaxis, 1700) Portia Griffith DNP   40 mg at 01/07/24 1720    folic acid tablet 1 mg  1 mg Oral Daily Lawrence Cosby MD   1 mg at 01/07/24 0954    glucagon (human recombinant) injection 1 mg  1 mg Intramuscular PRN Lawrence Cosby MD        glucose chewable tablet 16 g  16 g Oral PRN Lawrence Cosby MD        glucose chewable tablet 24 g  24 g Oral PRN Lawrence Cosby MD        HYDROcodone-acetaminophen  mg per tablet 1 tablet  1 tablet Oral Q4H PRN Lawrence Cosby MD   1 tablet at 01/06/24 2231    HYDROcodone-acetaminophen 5-325 mg per tablet 1 tablet  1 tablet Oral Q4H PRN Lawrence Cosby MD   1 tablet at 01/07/24 1720    HYDROmorphone injection 1 mg  1 mg Intravenous Q6H PRN Lawrence Cosby MD   1 mg at 01/05/24 1058    melatonin tablet 6 mg  6 mg Oral Nightly PRN Lawrence Cosby MD   6 mg at 01/06/24 2231    multivitamin tablet  1 tablet Oral Daily Lawrence Cosby MD   1 tablet at 01/07/24 0954    naloxone 0.4 mg/mL injection 0.02 mg  0.02 mg Intravenous PRN Lawrence Cosby MD        ondansetron disintegrating tablet 8 mg  8 mg Oral Q8H PRN Lawrence Cosby MD        ondansetron injection 4 mg  4 mg Intravenous Q12H PRN Lawrence Cosby MD        polyethylene glycol packet 17 g  17 g Oral Daily Lawrence Cosby MD   17 g at 01/07/24 0954    prochlorperazine injection Soln 5 mg  5 mg Intravenous Q6H PRN Lawrence Cosby MD        rilpivirine HCl Tab  25 mg  1 tablet Oral QAM Lawrence Cosby MD   25 mg at 01/07/24 0608    simethicone chewable tablet 80 mg  1 tablet Oral QID PRN Lawrence Cosby MD        sodium chloride 0.9% flush 5 mL  5 mL Intravenous PRN Lawrence Cosby MD        thiamine tablet 100 mg  100 mg Oral Daily Lawrence Cosby MD   100 mg at 01/07/24 0954    traMADoL tablet 50 mg  50 mg Oral Q4H PRN Lawrence Cosby MD         Current Discharge Medication List        CONTINUE these medications which have NOT CHANGED    Details   amLODIPine (NORVASC) 10 MG tablet Take 10 mg by mouth once daily.      atorvastatin (LIPITOR) 40 MG tablet Take 40 mg by mouth.      ODEFSEY 200-25-25 mg Tab Take 1 tablet by mouth every morning.      OYSTER SHELL CALCIUM-VIT D3 500 mg-5 mcg (200 unit) per tablet Take 1 tablet by mouth 2 (two) times daily.      ketoconazole (NIZORAL) 2 % cream Apply 2 % topically once daily.               I have seen and examined this patient within the last 30 days. My clinical findings that support the need for the home health skilled services and home bound status are the following:no   Weakness/numbness causing balance and gait disturbance due to Fracture making it taxing to leave home.  Requiring assistive device to leave home due to unsteady gait caused by  Fracture.     Diet:   regular diet    Labs:  none    Referrals/ Consults  Physical Therapy to evaluate and treat. Evaluate for home safety and equipment needs; Establish/upgrade home exercise program. Perform / instruct on therapeutic exercises, gait training, transfer training, and Range of Motion.  Occupational Therapy to evaluate and treat. Evaluate home environment for safety and equipment needs. Perform/Instruct on transfers, ADL training, ROM, and therapeutic exercises.    Activities:   activity as tolerated and other NWB LLL    Nursing:   Agency to admit patient within 24 hours of hospital discharge unless specified on physician order or at patient request    SN to  complete comprehensive assessment including routine vital signs. Instruct on disease process and s/s of complications to report to MD. Review/verify medication list sent home with the patient at time of discharge  and instruct patient/caregiver as needed. Frequency may be adjusted depending on start of care date.     Skilled nurse to perform up to 3 visits PRN for symptoms related to diagnosis    Notify MD if SBP > 160 or < 90; DBP > 90 or < 50; HR > 120 or < 50; Temp > 101; O2 < 88%    Ok to schedule additional visits based on staff availability and patient request on consecutive days within the home health episode.    When multiple disciplines ordered:    Start of Care occurs on Sunday - Wednesday schedule remaining discipline evaluations as ordered on separate consecutive days following the start of care.    Thursday SOC -schedule subsequent evaluations Friday and Monday the following week.     Friday - Saturday SOC - schedule subsequent discipline evaluations on consecutive days starting Monday of the following week.    For all post-discharge communication and subsequent orders please contact patient's primary care physician. If unable to reach primary care physician or do not receive response within 30 minutes, please contact [facility] for clinical staff order clarification    Miscellaneous   Routine Skin for Bedridden Patients: Instruct patient/caregiver to apply moisture barrier cream to all skin folds and wet areas in perineal area daily and after baths and all bowel movements.    Home Health Aide:  Physical Therapy Three times weekly and Occupational Therapy Three times weekly    Wound Care Orders  none    I certify that this patient is confined to his home and needs physical therapy and occupational therapy.

## 2024-01-09 PROCEDURE — 25000003 PHARM REV CODE 250: Performed by: ORTHOPAEDIC SURGERY

## 2024-01-09 PROCEDURE — 25000003 PHARM REV CODE 250: Performed by: PHYSICIAN ASSISTANT

## 2024-01-09 PROCEDURE — 11000001 HC ACUTE MED/SURG PRIVATE ROOM

## 2024-01-09 PROCEDURE — 97535 SELF CARE MNGMENT TRAINING: CPT | Mod: CO

## 2024-01-09 PROCEDURE — 97116 GAIT TRAINING THERAPY: CPT | Mod: CQ

## 2024-01-09 RX ADMIN — POLYETHYLENE GLYCOL 3350 17 G: 17 POWDER, FOR SOLUTION ORAL at 08:01

## 2024-01-09 RX ADMIN — THERA TABS 1 TABLET: TAB at 08:01

## 2024-01-09 RX ADMIN — Medication 100 MG: at 08:01

## 2024-01-09 RX ADMIN — ATORVASTATIN CALCIUM 40 MG: 20 TABLET, FILM COATED ORAL at 08:01

## 2024-01-09 RX ADMIN — EMTRICITABINE AND TENOFOVIR ALAFENAMIDE 1 TABLET: 200; 25 TABLET ORAL at 08:01

## 2024-01-09 RX ADMIN — RIVAROXABAN 10 MG: 10 TABLET, FILM COATED ORAL at 05:01

## 2024-01-09 RX ADMIN — RILPIVIRINE HYDROCHLORIDE 25 MG: 25 TABLET, FILM COATED ORAL at 08:01

## 2024-01-09 RX ADMIN — FOLIC ACID 1 MG: 1 TABLET ORAL at 08:01

## 2024-01-09 RX ADMIN — AMLODIPINE BESYLATE 10 MG: 5 TABLET ORAL at 08:01

## 2024-01-09 NOTE — PT/OT/SLP PROGRESS
Occupational Therapy   Treatment    Name: Damien Sanchez  MRN: [id number]  Admitting Diagnosis:  Closed fracture of neck of left femur  4 Days Post-Op    Recommendations:     Discharge Recommendations: High Intensity Therapy  Discharge Equipment Recommendations:  bedside commode, bath bench, walker, rolling  Barriers to discharge:  Inaccessible home environment, Decreased caregiver support (current functional level)    Assessment:     Damien Sanchez is a 58 y.o. male with a medical diagnosis of Closed fracture of neck of left femur.  He presents with no pain. Performance deficits affecting function are weakness, impaired self care skills, impaired functional mobility, gait instability, impaired balance, decreased coordination, decreased lower extremity function, decreased ROM, decreased safety awareness. Patient with CGA, RW for functional mobility though with 1 instance of LOB while turning with RW d/t patient lifting RW while turning; able to regain balance with Min A. Patient educated on RW management and navigation while keeping RW on the ground to avoid fall risks. Patient verbally understood. CGA, grab bars for toilet transfer. CGA for dynamic standing balance while performing clothing management; patient able to perform seated vivek hygiene with SBA. Patient educated on adaptive technique with LB Dressing. Patient verbally understood and donned paper scrub pants with Min A d/t  socks catching on LLE and assistance with standing balance. Overall, tolerated session well and progressing toward goals.     The mobility limitation cannot be sufficiently resolved by the use of a cane.   Patient's functional mobility deficit can be sufficiently resolved with the use of a rolling walker. Patient's mobility limitation significantly impairs their ability to participate in one of more activities of daily living. The use of a rolling walker will significantly improve the patient's ability to participate in MRADLS and the  patient will use it on regular basis in the home.     Prior to onset of impairments pt was independent with mobility and self-care and there is expectation of returning to prior level of function to maintain independence. Pt's clinical condition meets full high intensive therapy criteria. High intensive therapy will provide to total interdisciplinary treatment approach needed. Pt is at high risk of unplanned readmission due to fall risk and inaccessible home. Lower intensive therapy cannot provide total interdisciplinary approach needed.     Rehab Prognosis:  Good; patient would benefit from acute skilled OT services to address these deficits and reach maximum level of function.       Plan:     Patient to be seen 5 x/week to address the above listed problems via self-care/home management, therapeutic activities, therapeutic exercises  Plan of Care Expires: 01/20/24  Plan of Care Reviewed with: patient    Subjective     Chief Complaint: Needing to have BM   Patient/Family Comments/goals: wanting to transfer to toilet instead of BSC  Pain/Comfort:  Pain Rating 1: 0/10    Objective:     Communicated with: RN prior to session.  Patient found supine with peripheral IV upon OT entry to room.    General Precautions: Standard, fall, seizure    Orthopedic Precautions:LLE non weight bearing  Braces: N/A  Respiratory Status: Room air     Occupational Performance:     Bed Mobility:    Supine > Sit: CGA      Functional Mobility/Transfers:  Sit <> Stand: CGA   Functional Mobility: CGA, RW NWB LLE for functional mobility though with 1 instance of LOB while turning with RW d/t patient lifting RW while turning; able to regain balance with Min A  Patient educated on RW management and navigation while keeping RW on the ground to avoid fall risks  Toilet transfer: CGA, grab bars with steady descend/lift with cues for NWB LLE     Activities of Daily Living:  Toileting: CGA for dynamic standing balance while performing clothing  management; patient able to perform seated vivek hygiene with SBA  Grooming: CGA, RW for hand washing while standing in front of sink   LB Dressing: Min A to don pants d/t  socks catching on LLE and assistance with standing balance for waistline management      [facility] 6 Click ADL:      Treatment & Education:  OT role, plan of care, progression of goals, importance of continued OOB activity, ADL/functional transfer and mobility retraining, discharge recommendation, call don't fall, safety precautions, fall prevention.     Patient left up in chair with all lines intact, call button in reach, and RN notified    GOALS:   Multidisciplinary Problems       Occupational Therapy Goals          Problem: Occupational Therapy    Goal Priority Disciplines Outcome Interventions   Occupational Therapy Goal     OT, PT/OT Ongoing, Progressing    Description: Goals to be met by: 1/20/2024     Patient will increase functional independence with ADLs by performing:    UE Dressing with Set-up Assistance.  LE Dressing with Contact Guard Assistance.  Grooming while seated at sink with Set-up Assistance.  Toileting from bedside commode with Stand-by Assistance for hygiene and clothing management.   Toilet transfer to bedside commode with Stand-by Assistance.                         Time Tracking:     OT Date of Treatment: 01/09/24  OT Start Time: 0911  OT Stop Time: 0926  OT Total Time (min): 15 min    Billable Minutes:Self Care/Home Management 15 min    OT/NAVI: NAVI     Number of NAVI visits since last OT visit: 2    1/9/2024

## 2024-01-09 NOTE — PT/OT/SLP PROGRESS
Physical Therapy Treatment    Patient Name:  Damien Sanchez   MRN:  [id number]    Recommendations:     Discharge Recommendations: High Intensity Therapy  Discharge Equipment Recommendations: bedside commode, bath bench, walker, rolling, wheelchair  Barriers to discharge: Inaccessible home and Decreased caregiver support    Assessment:     Damien Sanchez is a 58 y.o. male admitted with a medical diagnosis of Closed fracture of neck of left femur.  He presents with the following impairments/functional limitations: weakness, gait instability, impaired balance, impaired functional mobility, impaired self care skills, decreased coordination, decreased lower extremity function, decreased ROM, decreased safety awareness.    Supine>sit with SBA  Sit>stand with RW and CGA  Amb 30' with RW and CGA, L LE NWB, cueing for decreased gait speed and increased control of RW and mobility, for greater safety, good carryover  Pt progressing toward goals  Rec High Intensity Therapy    Prior to admission pt was modified independent with mobility and self-care and there is expectation of returning to prior level of function to maintain independence avoiding readmission. Pt is at high risk of unplanned readmission due to fall risk and lack of 24 hour caregiver in prior setting. The lower level of care cannot provide total interdisciplinary approach needed. Pt is able to tolerate 3 hours of daily therapy. Pt is pleasant and motivated to return to prior level of function.        Rehab Prognosis: Good; patient would benefit from acute skilled PT services to address these deficits and reach maximum level of function.    Recent Surgery: Procedure(s) (LRB):  ORIF, HIP (Left) 4 Days Post-Op    Plan:     During this hospitalization, patient to be seen 5 x/week to address the identified rehab impairments via gait training, therapeutic activities, therapeutic exercises, neuromuscular re-education and progress toward the following goals:    Plan of Care  Expires:  24    Subjective     Chief Complaint: Why can't I use crutches instead of this walker?  Patient/Family Comments/goals: Pt agreeable to therapy  Pain/Comfort:  Pain Rating 1: 0/10  Pain Rating Post-Intervention 1: 0/10      Objective:     Communicated with nursing prior to session.  Patient found HOB elevated with peripheral IV upon PT entry to room.     General Precautions: Standard, fall, seizure  Orthopedic Precautions: LLE non weight bearing  Braces: N/A  Respiratory Status: Room air     Functional Mobility:  Bed Mobility:     Supine to Sit: stand by assistance  Transfers:     Sit to Stand:  contact guard assistance with rolling walker  Gait: 30' with RW and CGA, L LE NWB, cueing for decreased gait speed and increased control of RW and mobility, for greater safety, good carryover      AM-PAC 6 CLICK MOBILITY  Turning over in bed (including adjusting bedclothes, sheets and blankets)?: 4  Sitting down on and standing up from a chair with arms (e.g., wheelchair, bedside commode, etc.): 3  Moving from lying on back to sitting on the side of the bed?: 3  Moving to and from a bed to a chair (including a wheelchair)?: 3  Need to walk in hospital room?: 3  Climbing 3-5 steps with a railing?: 2  Basic Mobility Total Score: 18       Treatment & Education:  Gait as noted    Patient left sitting edge of bed with all lines intact and call button in reach..    GOALS:   Multidisciplinary Problems       Physical Therapy Goals          Problem: Physical Therapy    Goal Priority Disciplines Outcome Goal Variances Interventions   Physical Therapy Goal     PT, PT/OT Ongoing, Progressing     Description: Patient will increase functional independence with mobility by performin. Sit<>supine with indep.  2. Sit<>stand using indep and RW, LLE NWB.  3. Gait x 100 ft using RW and  MOD I, LLE NWB.  4. Ascend/descend 3 stair(s) using  handrail(s) and LLE NWB.                             Time Tracking:     PT Received  On: 01/09/24  PT Start Time: 1303     PT Stop Time: 1313  PT Total Time (min): 10 min     Billable Minutes: Gait Training 10    Treatment Type: Treatment  PT/PTA: PTA     Number of PTA visits since last PT visit: 2     01/09/2024

## 2024-01-09 NOTE — SUBJECTIVE & OBJECTIVE
Interval History: Seen at bedside, just finished breakfast and feels like he needs to have a bowel movement. Awaiting rehab    Review of Systems   Constitutional:  Positive for activity change (due to weight-bearing limitations).   Respiratory:  Negative for shortness of breath.    Gastrointestinal:  Positive for constipation (has the urge to go). Negative for abdominal pain, diarrhea and vomiting.   Musculoskeletal:  Positive for arthralgias.   Neurological:  Negative for dizziness and weakness.   Psychiatric/Behavioral:  Negative for agitation.      Objective:     Vital Signs (Most Recent):  Temp: 98.7 °F (37.1 °C) (01/09/24 0811)  Pulse: 83 (01/09/24 0811)  Resp: 20 (01/09/24 0811)  BP: 112/71 (01/09/24 0811)  SpO2: 96 % (01/09/24 0811) Vital Signs (24h Range):  Temp:  [98.5 °F (36.9 °C)-99.2 °F (37.3 °C)] 98.7 °F (37.1 °C)  Pulse:  [77-92] 83  Resp:  [18-20] 20  SpO2:  [96 %-98 %] 96 %  BP: (107-124)/(60-72) 112/71     Weight: 59 kg (130 lb 0.1 oz)  Body mass index is 20.36 kg/m².    Intake/Output Summary (Last 24 hours) at 1/9/2024 1046  Last data filed at 1/9/2024 0054  Gross per 24 hour   Intake 480 ml   Output 775 ml   Net -295 ml         Physical Exam  Vitals reviewed.   Constitutional:       Appearance: Normal appearance.   Cardiovascular:      Rate and Rhythm: Normal rate.   Pulmonary:      Effort: Pulmonary effort is normal. No respiratory distress.   Abdominal:      General: Abdomen is flat. There is no distension.   Musculoskeletal:         General: Swelling and tenderness present.      Comments: Left femur fracture with ORIF/swelling at surgicla site.   Skin:     General: Skin is warm.   Neurological:      General: No focal deficit present.      Mental Status: He is alert.   Psychiatric:         Mood and Affect: Mood normal.         Behavior: Behavior normal.             Significant Labs: All pertinent labs within the past 24 hours have been reviewed.    Significant Imaging: I have reviewed all  pertinent imaging results/findings within the past 24 hours.

## 2024-01-09 NOTE — PLAN OF CARE
Patient's insurance denied rehab and is offering a peer to peer within 24 hours by calling [phone]. Sw provided info to provider.        01/09/24 1308   Post-Acute Status   Post-Acute Authorization Placement   Post-Acute Placement Status Pending payor medical review/second level review   Hospital Resources/Appts/Education Provided Provided patient/caregiver with written discharge plan information;Appointments scheduled and added to AVS   Patient choice form signed by patient/caregiver List with quality metrics by geographic area provided   Discharge Delays None known at this time   Discharge Plan   Discharge Plan A Rehab

## 2024-01-09 NOTE — PROGRESS NOTES
[facility] Surg [address] Medicine  Progress Note    Patient Name: Damien Sanchez  MRN: [id number]  Patient Class: IP- Inpatient   Admission Date: 1/4/2024  Length of Stay: 4 days  Attending Physician: NKECHI Lam MD  Primary Care Provider: Kendy, Primary Doctor        Subjective:     Principal Problem:Closed fracture of neck of left femur        HPI:  Damien Sanchez is a 58M with HIV on odefsey, HTN and alcohol abuse who presents for hip pain sustained after a fall. Today he was walking to his friend's house to celebrate her birthday when he fell on his left side. Denies hitting his head or losing consciousness. Since the fall he was unable to walk because of left hip pain. He drinks daily, including drinking today. Usually he drinks about 24 beers per day, denies history of alcohol withdrawal.     In ER: hip xray shows left humeral fracture, ortho consulted, IV ketorolac ordered    Overview/Hospital Course:  Patient had a left ORIF on 1/5.  He was able to ambulate with therapy on 1/6 and his pain is controlled.  Started on Xarelto for 21 days for DVT prevention per orthopedic recommendations.  PT and OT recommending high-intensity therapy, patient is agreeable to rehab placement.  He will need orthopedic follow-up outpatient    Interval History: Seen at bedside, just finished breakfast and feels like he needs to have a bowel movement. Awaiting rehab    Review of Systems   Constitutional:  Positive for activity change (due to weight-bearing limitations).   Respiratory:  Negative for shortness of breath.    Gastrointestinal:  Positive for constipation (has the urge to go). Negative for abdominal pain, diarrhea and vomiting.   Musculoskeletal:  Positive for arthralgias.   Neurological:  Negative for dizziness and weakness.   Psychiatric/Behavioral:  Negative for agitation.      Objective:     Vital Signs (Most Recent):  Temp: 98.7 °F (37.1 °C) (01/09/24 0811)  Pulse: 83 (01/09/24 0811)  Resp: 20  (01/09/24 0811)  BP: 112/71 (01/09/24 0811)  SpO2: 96 % (01/09/24 0811) Vital Signs (24h Range):  Temp:  [98.5 °F (36.9 °C)-99.2 °F (37.3 °C)] 98.7 °F (37.1 °C)  Pulse:  [77-92] 83  Resp:  [18-20] 20  SpO2:  [96 %-98 %] 96 %  BP: (107-124)/(60-72) 112/71     Weight: 59 kg (130 lb 0.1 oz)  Body mass index is 20.36 kg/m².    Intake/Output Summary (Last 24 hours) at 1/9/2024 1046  Last data filed at 1/9/2024 0054  Gross per 24 hour   Intake 480 ml   Output 775 ml   Net -295 ml         Physical Exam  Vitals reviewed.   Constitutional:       Appearance: Normal appearance.   Cardiovascular:      Rate and Rhythm: Normal rate.   Pulmonary:      Effort: Pulmonary effort is normal. No respiratory distress.   Abdominal:      General: Abdomen is flat. There is no distension.   Musculoskeletal:         General: Swelling and tenderness present.      Comments: Left femur fracture with ORIF/swelling at surgicla site.   Skin:     General: Skin is warm.   Neurological:      General: No focal deficit present.      Mental Status: He is alert.   Psychiatric:         Mood and Affect: Mood normal.         Behavior: Behavior normal.             Significant Labs: All pertinent labs within the past 24 hours have been reviewed.    Significant Imaging: I have reviewed all pertinent imaging results/findings within the past 24 hours.    Assessment/Plan:      * Closed fracture of neck of left femur  Left hip xray shows acute subcapital versus transcervical left femoral neck fracture  Orthopedics consulted, left ORIF done1/5  Nonweightbearing left lower extremity x4 weeks postop   Staples out in 2 weeks postop  Clinic follow-up 4 weeks postop   Xarelto times 21 days for VTE prevention  Pain control  PTOT working with patient and recommended high-intensity therapy   Mr. Sanchez is agreeable to go to rehab        Alcohol dependence  Drinks about 24 beers per day, last drink afternoon of admission  UDS +opiates/thc and EtOH 147  Start folic acid,  thiamine, MVI  CIWA q8H, seizure precautions  Valium PRN  Denies history of alcohol withdrawals  1/5 AM: denies HA, anxiety, sweats, hallucinations   -Remain symptom free of ETOh withdrawals      Hypertension  Stable  Continue home norvasc 10mg daily    Human immunodeficiency virus (HIV) disease  Updated CD4 count pending  Continue home [id number] ala 200-25-25 mg Tab 1 tablet daily (not on formulary, will have to order separately or have someone bring in meds from home)        HLD (hyperlipidemia)  Continue home lipitor        VTE Risk Mitigation (From admission, onward)           Ordered     rivaroxaban tablet 10 mg  With dinner         01/08/24 1101     IP VTE LOW RISK PATIENT  Once         01/04/24 1713                    Discharge Planning   MANUEL: 1/9/2024     Code Status: Full Code   Is the patient medically ready for discharge?:     Reason for patient still in hospital (select all that apply): Patient trending condition  Discharge Plan A: (P) Home                  Rubia Silveira PA-C  Department of Hospital Medicine   [facility] Surg ([address])

## 2024-01-09 NOTE — PLAN OF CARE
Problem: Adult Inpatient Plan of Care  Goal: Plan of Care Review  Outcome: Ongoing, Progressing  Flowsheets (Taken 1/9/2024 5960)  Plan of Care Reviewed With: patient  Goal: Patient-Specific Goal (Individualized)  Outcome: Ongoing, Progressing  Goal: Absence of Hospital-Acquired Illness or Injury  Outcome: Ongoing, Progressing  Goal: Optimal Comfort and Wellbeing  Outcome: Ongoing, Progressing  Goal: Readiness for Transition of Care  Outcome: Ongoing, Progressing     Problem: Infection  Goal: Absence of Infection Signs and Symptoms  Outcome: Ongoing, Progressing     Problem: Fall Injury Risk  Goal: Absence of Fall and Fall-Related Injury  Outcome: Ongoing, Progressing     Problem: Bleeding (Surgery Nonspecified)  Goal: Absence of Bleeding  Outcome: Ongoing, Progressing     Problem: Bowel Motility Impaired (Surgery Nonspecified)  Goal: Effective Bowel Elimination  Outcome: Ongoing, Progressing     Problem: Infection (Surgery Nonspecified)  Goal: Absence of Infection Signs and Symptoms  Outcome: Ongoing, Progressing     Problem: Ongoing Anesthesia Effects (Surgery Nonspecified)  Goal: Anesthesia/Sedation Recovery  Outcome: Ongoing, Progressing     Problem: Postoperative Urinary Retention (Surgery Nonspecified)  Goal: Effective Urinary Elimination  Outcome: Ongoing, Progressing     Problem: Respiratory Compromise (Surgery Nonspecified)  Goal: Effective Oxygenation and Ventilation  Outcome: Ongoing, Progressing

## 2024-01-10 VITALS
BODY MASS INDEX: 20.4 KG/M2 | HEART RATE: 89 BPM | TEMPERATURE: 98 F | WEIGHT: 130 LBS | RESPIRATION RATE: 16 BRPM | DIASTOLIC BLOOD PRESSURE: 70 MMHG | OXYGEN SATURATION: 99 % | HEIGHT: 67 IN | SYSTOLIC BLOOD PRESSURE: 111 MMHG

## 2024-01-10 LAB
ANION GAP SERPL CALC-SCNC: 7 MMOL/L (ref 8–16)
ANISOCYTOSIS BLD QL SMEAR: SLIGHT
BASOPHILS # BLD AUTO: 0.07 K/UL (ref 0–0.2)
BASOPHILS NFR BLD: 0.9 % (ref 0–1.9)
BUN SERPL-MCNC: 8 MG/DL (ref 6–20)
CALCIUM SERPL-MCNC: 9 MG/DL (ref 8.7–10.5)
CHLORIDE SERPL-SCNC: 106 MMOL/L (ref 95–110)
CO2 SERPL-SCNC: 21 MMOL/L (ref 23–29)
CREAT SERPL-MCNC: 0.7 MG/DL (ref 0.5–1.4)
DIFFERENTIAL METHOD BLD: ABNORMAL
EOSINOPHIL # BLD AUTO: 0.1 K/UL (ref 0–0.5)
EOSINOPHIL NFR BLD: 1 % (ref 0–8)
ERYTHROCYTE [DISTWIDTH] IN BLOOD BY AUTOMATED COUNT: 14.6 % (ref 11.5–14.5)
EST. GFR  (NO RACE VARIABLE): >60 ML/MIN/1.73 M^2
GLUCOSE SERPL-MCNC: 119 MG/DL (ref 70–110)
HCT VFR BLD AUTO: 29.1 % (ref 40–54)
HGB BLD-MCNC: 9.6 G/DL (ref 14–18)
HYPOCHROMIA BLD QL SMEAR: ABNORMAL
IMM GRANULOCYTES # BLD AUTO: 0.03 K/UL (ref 0–0.04)
IMM GRANULOCYTES NFR BLD AUTO: 0.4 % (ref 0–0.5)
LYMPHOCYTES # BLD AUTO: 3.1 K/UL (ref 1–4.8)
LYMPHOCYTES NFR BLD: 37.8 % (ref 18–48)
MCH RBC QN AUTO: 32.5 PG (ref 27–31)
MCHC RBC AUTO-ENTMCNC: 33 G/DL (ref 32–36)
MCV RBC AUTO: 99 FL (ref 82–98)
MONOCYTES # BLD AUTO: 1.5 K/UL (ref 0.3–1)
MONOCYTES NFR BLD: 18 % (ref 4–15)
NEUTROPHILS # BLD AUTO: 3.4 K/UL (ref 1.8–7.7)
NEUTROPHILS NFR BLD: 41.9 % (ref 38–73)
NRBC BLD-RTO: 0 /100 WBC
PLATELET # BLD AUTO: 230 K/UL (ref 150–450)
PLATELET BLD QL SMEAR: ABNORMAL
PMV BLD AUTO: 9.4 FL (ref 9.2–12.9)
POIKILOCYTOSIS BLD QL SMEAR: SLIGHT
POTASSIUM SERPL-SCNC: 3.9 MMOL/L (ref 3.5–5.1)
RBC # BLD AUTO: 2.95 M/UL (ref 4.6–6.2)
SODIUM SERPL-SCNC: 134 MMOL/L (ref 136–145)
WBC # BLD AUTO: 8.12 K/UL (ref 3.9–12.7)

## 2024-01-10 PROCEDURE — 25000003 PHARM REV CODE 250: Performed by: ORTHOPAEDIC SURGERY

## 2024-01-10 PROCEDURE — 80048 BASIC METABOLIC PNL TOTAL CA: CPT | Performed by: PHYSICIAN ASSISTANT

## 2024-01-10 PROCEDURE — 36415 COLL VENOUS BLD VENIPUNCTURE: CPT | Performed by: PHYSICIAN ASSISTANT

## 2024-01-10 PROCEDURE — 85025 COMPLETE CBC W/AUTO DIFF WBC: CPT | Performed by: PHYSICIAN ASSISTANT

## 2024-01-10 PROCEDURE — 97535 SELF CARE MNGMENT TRAINING: CPT | Mod: CO

## 2024-01-10 RX ORDER — HYDROCODONE BITARTRATE AND ACETAMINOPHEN 10; 325 MG/1; MG/1
1 TABLET ORAL EVERY 6 HOURS PRN
Qty: 20 TABLET | Refills: 0 | Status: SHIPPED | OUTPATIENT
Start: 2024-01-10 | End: 2024-01-24 | Stop reason: SDUPTHER

## 2024-01-10 RX ORDER — LANOLIN ALCOHOL/MO/W.PET/CERES
100 CREAM (GRAM) TOPICAL DAILY
Qty: 30 TABLET | Refills: 0 | Status: SHIPPED | OUTPATIENT
Start: 2024-01-11

## 2024-01-10 RX ORDER — FOLIC ACID 1 MG/1
1 TABLET ORAL DAILY
Qty: 30 TABLET | Refills: 0 | Status: SHIPPED | OUTPATIENT
Start: 2024-01-11 | End: 2024-02-10

## 2024-01-10 RX ADMIN — POLYETHYLENE GLYCOL 3350 17 G: 17 POWDER, FOR SOLUTION ORAL at 08:01

## 2024-01-10 RX ADMIN — AMLODIPINE BESYLATE 10 MG: 5 TABLET ORAL at 08:01

## 2024-01-10 RX ADMIN — RILPIVIRINE HYDROCHLORIDE 25 MG: 25 TABLET, FILM COATED ORAL at 08:01

## 2024-01-10 RX ADMIN — THERA TABS 1 TABLET: TAB at 08:01

## 2024-01-10 RX ADMIN — EMTRICITABINE AND TENOFOVIR ALAFENAMIDE 1 TABLET: 200; 25 TABLET ORAL at 08:01

## 2024-01-10 RX ADMIN — FOLIC ACID 1 MG: 1 TABLET ORAL at 08:01

## 2024-01-10 RX ADMIN — Medication 100 MG: at 08:01

## 2024-01-10 NOTE — PLAN OF CARE
Sw provided cost transfer for patient's medication which were delivered to his bedside. Patient is discharging with [facility] . Patient has a RW and BSC. Patient refused the TTB and WC. Sw scheduled transportation home for patient.     [facility] ([address])  Discharge Final Note    Primary Care Provider: No, Primary Doctor    Expected Discharge Date: 1/10/2024    Final Discharge Note (most recent)       Final Note - 01/10/24 1332          Final Note    Assessment Type Final Discharge Note (P)      Anticipated Discharge Disposition Home-Health Care Svc (P)      Hospital Resources/Appts/Education Provided Provided patient/caregiver with written discharge plan information;Appointments scheduled and added to AVS (P)         Post-Acute Status    Post-Acute Authorization Home Health (P)      Post-Acute Placement Status Set-up Complete/Auth obtained (P)      Home Health Status Set-up Complete/Auth obtained (P)      Patient choice form signed by patient/caregiver List with quality metrics by geographic area provided (P)      Discharge Delays PFC Arranged Transportation (P)                      Important Message from Medicare             Contact Info       [facility] [place]   Specialty: Home Health Services, Home Therapy Services, Home Living Aide Services    [address]  [facility] [address]   Phone: [phone]       Next Steps: Follow up on 1/11/2024    Instructions: They will contact you for home healthcare appointments.

## 2024-01-10 NOTE — PLAN OF CARE
Dacia reviewed pateint's DME. Patient has a RW and a BSC. Patient refused a WC and TTB.   I certify I provided patient choice and a list to the patient/family of [facility].  Patient/Family signed Patient's Choice Disclosure Form choosing the following: First available   Dacia sent referral to [facility] who stated they can service the patient.

## 2024-01-10 NOTE — PT/OT/SLP PROGRESS
Physical Therapy      Patient Name:  Damien Sanchez   MRN:  [id number]    Patient not seen today secondary to patient already discharged from hospital  .

## 2024-01-10 NOTE — CARE UPDATE
Damien Sanchez was hospitalized 1/4/2024 through 1/10/2024. Damien Sanchez may return to work 1/17/24. Nonweight bearing left lower extremity x 4 weeks post op (surgery done 1/5/24)        Rubia Silveira PA-C  [facility] Medicine  [facility]

## 2024-01-10 NOTE — ASSESSMENT & PLAN NOTE
Left hip xray shows acute subcapital versus transcervical left femoral neck fracture  Orthopedics consulted, left ORIF done1/5  Nonweightbearing left lower extremity x4 weeks postop   Staples out in 2 weeks postop  Clinic follow-up 4 weeks postop   Xarelto times 21 days for VTE prevention  Pain control  PTOT working with patient and recommended high-intensity therapy   Mr. Sanchez is agreeable to go to rehab however insurance denied  Stable for discharge with PCP fu in 2 weeks to remove staples, ortho fu in 4 weeks, hydrocodone PRN and xarelto to complete 21d course of VTE prevention  Non weight bearing LLE x 4 weeks post op  C done

## 2024-01-10 NOTE — PLAN OF CARE
[facility] ([address])      HOME HEALTH ORDERS  FACE TO FACE ENCOUNTER    Patient Name: Damien Sanchez  YOB: 1965    PCP: No, Primary Doctor   PCP Address: None  PCP Phone Number: None  PCP Fax: None    Encounter Date: 1/4/24    Admit to Home Health    Diagnoses:  Active Hospital Problems    Diagnosis  POA    *Closed fracture of neck of left femur [S72.002A]  Yes     Priority: 1 - High    Alcohol dependence [F10.20]  Yes     Priority: 2     Alcoholic cirrhosis of liver without ascites [K70.30]  Yes     Formatting of this note might be different from the original.   Added automatically from request for surgery [id number]      HLD (hyperlipidemia) [E78.5]  Yes    Hypertension [I10]  Yes     dx update  Formatting of this note might be different from the original.   dx update      Human immunodeficiency virus (HIV) disease [B20]  Yes     dx update  Formatting of this note might be different from the original.   dx update        Resolved Hospital Problems   No resolved problems to display.       Follow Up Appointments:  Future Appointments   Date Time Provider Department Center   1/24/2024  9:00 AM Francy Villela MD [facility]       Allergies:  Review of patient's allergies indicates:   Allergen Reactions    Lisinopril Other (See Comments)     Angioedema^    Angioedema^   Angioedema^       Medications: Review discharge medications with patient and family and provide education.    Current Facility-Administered Medications   Medication Dose Route Frequency Provider Last Rate Last Admin    acetaminophen tablet 1,000 mg  1,000 mg Oral Q8H PRN Lawrence Cosby MD        acetaminophen tablet 650 mg  650 mg Oral Q4H PRN Lawrence Cosby MD        albuterol-ipratropium 2.5 mg-0.5 mg/3 mL nebulizer solution 3 mL  3 mL Nebulization Q4H PRN Lawrence Cosby MD        aluminum-magnesium hydroxide-simethicone 200-200-20 mg/5 mL suspension 30 mL  30 mL Oral QID PRN Lawrence Cosby MD         amLODIPine tablet 10 mg  10 mg Oral Daily Lawrence Cosby MD   10 mg at 01/10/24 0824    atorvastatin tablet 40 mg  40 mg Oral QHS Lawrence Cosby MD   40 mg at 01/09/24 2039    bisacodyL suppository 10 mg  10 mg Rectal Daily PRN Lawrence Cosby MD        dextrose 10% bolus 125 mL 125 mL  12.5 g Intravenous PRN Lawrence Cosby MD        dextrose 10% bolus 250 mL 250 mL  25 g Intravenous PRN Lawrence Cosby MD        diazePAM tablet 5 mg  5 mg Oral Q4H PRN Lawrence Cosby MD        emtricitabine-tenofovir alafen 200-25 mg Tab 1 tablet  1 tablet Oral QAM Lawrence Cosby MD   1 tablet at 01/10/24 0824    folic acid tablet 1 mg  1 mg Oral Daily Lawrence Cosby MD   1 mg at 01/10/24 0824    glucagon (human recombinant) injection 1 mg  1 mg Intramuscular PRN Lawrence Cosby MD        glucose chewable tablet 16 g  16 g Oral PRN Lawrence Cosby MD        glucose chewable tablet 24 g  24 g Oral PRN Lawrence Cosby MD        HYDROcodone-acetaminophen  mg per tablet 1 tablet  1 tablet Oral Q4H PRN Lawrence Cosby MD   1 tablet at 01/07/24 2117    HYDROcodone-acetaminophen 5-325 mg per tablet 1 tablet  1 tablet Oral Q4H PRN Lawrence Cosby MD   1 tablet at 01/08/24 0620    HYDROmorphone injection 1 mg  1 mg Intravenous Q6H PRN Lawrence Cosby MD   1 mg at 01/05/24 1058    melatonin tablet 6 mg  6 mg Oral Nightly PRN Lawrence Cosby MD   6 mg at 01/08/24 2111    multivitamin tablet  1 tablet Oral Daily Lawrence Cosby MD   1 tablet at 01/10/24 0824    naloxone 0.4 mg/mL injection 0.02 mg  0.02 mg Intravenous PRN Lawrence Cosby MD        ondansetron disintegrating tablet 8 mg  8 mg Oral Q8H PRN Lawrence Cosby MD        ondansetron injection 4 mg  4 mg Intravenous Q12H PRN Lawrence Cosby MD        polyethylene glycol packet 17 g  17 g Oral Daily Lawrence Cosby MD   17 g at 01/10/24 0824    prochlorperazine injection Soln 5 mg  5 mg Intravenous Q6H PRN Lawrence Cosby MD         rilpivirine HCl Tab 25 mg  1 tablet Oral QAM Lawrence Cosby MD   25 mg at 01/10/24 0824    rivaroxaban tablet 10 mg  10 mg Oral Daily with dinner Rubia Silveira PA-C   10 mg at 01/09/24 1727    simethicone chewable tablet 80 mg  1 tablet Oral QID PRN Lawrence Cosby MD        sodium chloride 0.9% flush 5 mL  5 mL Intravenous PRN Lawrence Cosby MD        thiamine tablet 100 mg  100 mg Oral Daily Lawrence Cosby MD   100 mg at 01/10/24 0824    traMADoL tablet 50 mg  50 mg Oral Q4H PRN Lawrence Cosby MD         Current Discharge Medication List        START taking these medications    Details   folic acid (FOLVITE) 1 MG tablet Take 1 tablet (1 mg total) by mouth once daily.  Qty: 30 tablet, Refills: 0      HYDROcodone-acetaminophen (NORCO)  mg per tablet Take 1 tablet by mouth every 6 (six) hours as needed for Pain.  Qty: 20 tablet, Refills: 0    Comments: Quantity prescribed more than 7 day supply? No      polyethylene glycol (GLYCOLAX) 17 gram PwPk Take 17 g by mouth once daily.  Refills: 0      rivaroxaban (XARELTO) 10 mg Tab Take 1 tablet (10 mg total) by mouth daily with dinner or evening meal. for 19 days  Qty: 20 tablet, Refills: 0      thiamine 100 MG tablet Take 1 tablet (100 mg total) by mouth once daily.  Qty: 30 tablet, Refills: 0           CONTINUE these medications which have NOT CHANGED    Details   amLODIPine (NORVASC) 10 MG tablet Take 10 mg by mouth once daily.      atorvastatin (LIPITOR) 40 MG tablet Take 40 mg by mouth.      ODEFSEY 200-25-25 mg Tab Take 1 tablet by mouth every morning.      OYSTER SHELL CALCIUM-VIT D3 500 mg-5 mcg (200 unit) per tablet Take 1 tablet by mouth 2 (two) times daily.      ketoconazole (NIZORAL) 2 % cream Apply 2 % topically once daily.               I have seen and examined this patient within the last 30 days. My clinical findings that support the need for the home health skilled services and home bound status are the  following:no   Weakness/numbness causing balance and gait disturbance due to Fracture making it taxing to leave home.     Diet:   regular diet    Labs:  N/a    Referrals/ Consults  Physical Therapy to evaluate and treat. Evaluate for home safety and equipment needs; Establish/upgrade home exercise program. Perform / instruct on therapeutic exercises, gait training, transfer training, and Range of Motion.  Occupational Therapy to evaluate and treat. Evaluate home environment for safety and equipment needs. Perform/Instruct on transfers, ADL training, ROM, and therapeutic exercises.    Activities:   activity as tolerated and other NON WEIGHT BEARING LEFT LOWER EXTREMITY x4 WEEKS POST OP  STAPLES OUT 2 WEEKS POSTOP  Nursing:   Agency to admit patient within 24 hours of hospital discharge unless specified on physician order or at patient request    SN to complete comprehensive assessment including routine vital signs. Instruct on disease process and s/s of complications to report to MD. Review/verify medication list sent home with the patient at time of discharge  and instruct patient/caregiver as needed. Frequency may be adjusted depending on start of care date.     Skilled nurse to perform up to 3 visits PRN for symptoms related to diagnosis    Notify MD if SBP > 160 or < 90; DBP > 90 or < 50; HR > 120 or < 50; Temp > 101; O2 < 88%    Ok to schedule additional visits based on staff availability and patient request on consecutive days within the home health episode.    When multiple disciplines ordered:    Start of Care occurs on Sunday - Wednesday schedule remaining discipline evaluations as ordered on separate consecutive days following the start of care.    Thursday SOC -schedule subsequent evaluations Friday and Monday the following week.     Friday - Saturday SOC - schedule subsequent discipline evaluations on consecutive days starting Monday of the following week.      Miscellaneous   Routine Skin for Bedridden  Patients: Instruct patient/caregiver to apply moisture barrier cream to all skin folds and wet areas in perineal area daily and after baths and all bowel movements.    Home Health Aide:  Physical Therapy Other: eval and treat  and Occupational Therapy Other: eval and treat    Wound Care Orders  no    I certify that this patient is confined to his home and needs physical therapy and occupational therapy.

## 2024-01-10 NOTE — PLAN OF CARE
Free from falls, injury, or skin breakdown this hospital admission. Pt discharged to home via [facility]. Pt eager & in agreement w/ DC. VU of DC instructions--paperwork passed & explained-- Scripts filled and delivered to bedside. Voiding, ambulating w/ walker (NWB to LLE), & tolerating PO well. Dressing to L hip  WNL.

## 2024-01-10 NOTE — DISCHARGE SUMMARY
[facility] S[facility] [address] Medicine  Discharge Summary      Patient Name: Damien Sanchez  MRN: [id number]  MALLORY: [id number]  Patient Class: IP- Inpatient  Admission Date: 1/4/2024  Hospital Length of Stay: 5 days  Discharge Date and Time: 1/10/2024  3:00 PM  Attending Physician: Kendy att. providers found   Discharging Provider: Rubia Silveira PA-C  Primary Care Provider: Kendy, Primary Doctor    Primary Care Team: Networked reference to record PCT     HPI:   Damien Sanchez is a 58M [facility], HTN and alcohol abuse who presents for hip pain sustained after a fall. Today he was walking to his friend's house to celebrate her birthday when he fell on his left side. Denies hitting his head or losing consciousness. Since the fall he was unable to walk because of left hip pain. He drinks daily, including drinking today. Usually he drinks about 24 beers per day, denies history of alcohol withdrawal.     In ER: hip xray shows left humeral fracture, ortho consulted, IV ketorolac ordered    Procedure(s) (LRB):  ORIF, HIP (Left)      Hospital Course:   Patient had a left ORIF on 1/5.  He was able to ambulate with therapy on 1/6 and his pain is controlled.  Started on Xarelto for 21 days for DVT prevention per orthopedic recommendations.  PT and OT recommending high-intensity therapy, patient is agreeable to rehab placement however insurance denied so he opted to go home with [facility]. Stable for discharge with xarelto to complete 21d DVT prevention, hydrocodone for pain PRN, PCP fu for staples out in 2 weeks post op and Ortho fu 4 weeks post op. Non-weight bearing LLE x 4 weeks post op. Return precautions discussed, no further questions at discharge     Goals of Care Treatment Preferences:  Code Status: Full Code      Consults:   Consults (From admission, onward)          Status Ordering Provider     Inpatient consult to Orthopedic Surgery  Once        Provider:  Lawrence Cosby MD Completed HAUVER, TERENCE  "L. II            Orthopedic  * Closed fracture of neck of left femur  Left hip xray shows acute subcapital versus transcervical left femoral neck fracture  Orthopedics consulted, left ORIF done1/5  Nonweightbearing left lower extremity x4 weeks postop   Staples out in 2 weeks postop  Clinic follow-up 4 weeks postop   Xarelto times 21 days for VTE prevention  Pain control  PTOT working with patient and recommended high-intensity therapy   Mr. Sanchez is agreeable to go to rehab however insurance denied  Stable for discharge with PCP fu in 2 weeks to remove staples, ortho fu in 4 weeks, hydrocodone PRN and xarelto to complete 21d course of VTE prevention  Non weight bearing LLE x 4 weeks post op  HHC done          Final Active Diagnoses:    Diagnosis Date Noted POA    PRINCIPAL PROBLEM:  Closed fracture of neck of left femur [S72.002A] 01/04/2024 Yes    Alcohol dependence [F10.20] 06/03/2015 Yes    Alcoholic cirrhosis of liver without ascites [K70.30] 01/29/2021 Yes    HLD (hyperlipidemia) [E78.5] 12/19/2017 Yes    Hypertension [I10] 05/22/2013 Yes    Human immunodeficiency virus (HIV) disease [B20] 08/19/2009 Yes      Problems Resolved During this Admission:       Discharged Condition: stable    Disposition: Home or Self Care    Follow Up:   Follow-up Information       [facility] Follow up on 1/11/2024.    Specialties: Home Health Services, Home Therapy Services, Home Living Aide Services  Why: They will contact you for home healthcare appointments.  Contact information:  [address] [address]  [phone]                         Patient Instructions:      WHEELCHAIR FOR HOME USE     Order Specific Question Answer Comments   Hours in W/C per day: 4    Type of Wheelchair: Standard    Size(Width): 18"(STD adult)    Leg Support: STD footrests    Lap Belt: Velcro    Accessories: Anti-tippers    Cushion: Basic    Reclining Back No    Height: 5' 7" (1.702 m)    Weight: " "59 kg (130 lb 0.1 oz)    Does patient have medical equipment at home? none    Length of need (1-99 months): 3    Please check all that apply: Caregiver is capable and willing to operate wheelchair safely.    Please check all that apply: Patient's upper body strength is sufficient for propulsion.    Please check all that apply: The patient requires the use of a w/c for activities of daily living within the Home.      COMMODE FOR HOME USE     Order Specific Question Answer Comments   Type: Standard    Height: 5' 7" (1.702 m)    Weight: 59 kg (130 lb 0.1 oz)    Does patient have medical equipment at home? none    Length of need (1-99 months): 3      TRANSFER TUB BENCH FOR HOME USE     Order Specific Question Answer Comments   Type of Transfer Tub Bench: Unpadded    Height: 5' 7" (1.702 m)    Weight: 59 kg (130 lb 0.1 oz)    Does patient have medical equipment at home? none    Length of need (1-99 months): 3    Patient notified - Not covered by insurance considered a convenience item No    Discussed financial responsibility with responsible party No      WALKER FOR HOME USE     Order Specific Question Answer Comments   Type of Walker: Adult (5'4"-6'6")    With wheels? Yes    Height: 5' 7" (1.702 m)    Weight: 59 kg (130 lb 0.1 oz)    Length of need (1-99 months): 3    Does patient have medical equipment at home? none    Please check all that apply: Patient's condition impairs ambulation.    Please check all that apply: Patient is unable to safely ambulate without equipment.    Please check all that apply: Patient needs help to get in and out of chair.      Ambulatory referral/consult to Orthopedics   Standing Status: Future   Referral Priority: Routine Referral Type: Consultation   Requested Specialty: Orthopedic Surgery   Number of Visits Requested: 1     Notify your health care provider if you experience any of the following:  temperature >100.4     Notify your health care provider if you experience any of the " following:  severe uncontrolled pain     Notify your health care provider if you experience any of the following:  redness, tenderness, or signs of infection (pain, swelling, redness, odor or green/yellow discharge around incision site)     Notify your health care provider if you experience any of the following:  difficulty breathing or increased cough     Notify your health care provider if you experience any of the following:  worsening rash     Notify your health care provider if you experience any of the following:  persistent dizziness, light-headedness, or visual disturbances     Notify your health care provider if you experience any of the following:  increased confusion or weakness     Reason for not Ordering Smoking Cessation Referral     Order Specific Question Answer Comments   Reason for not ordering: Patient refused      Reason for not Prescribing Nicotine Replacement     Order Specific Question Answer Comments   Reason for not Prescribing: Patient refused      Weight bearing restrictions (specify):   Order Comments: Nonweigt bearing LLE x4 weeks postop       Significant Diagnostic Studies: Radiology: X-Ray:  hip xray:    Acute subcapital versus transcervical left femoral neck fracture.       Pending Diagnostic Studies:       None           Medications:  Reconciled Home Medications:      Medication List        START taking these medications      folic acid 1 MG tablet  Commonly known as: FOLVITE  Take 1 tablet (1 mg total) by mouth once daily.  Start taking on: January 11, 2024     HYDROcodone-acetaminophen  mg per tablet  Commonly known as: NORCO  Take 1 tablet by mouth every 6 (six) hours as needed for Pain.     polyethylene glycol 17 gram Pwpk  Commonly known as: GLYCOLAX  Take 17 g by mouth once daily.     rivaroxaban 10 mg Tab  Commonly known as: XARELTO  Take 1 tablet (10 mg total) by mouth daily with dinner or evening meal. for 19 days  Start taking on: January 11, 2024     thiamine 100 MG  tablet  Take 1 tablet (100 mg total) by mouth once daily.  Start taking on: January 11, 2024            CONTINUE taking these medications      amLODIPine 10 MG tablet  Commonly known as: NORVASC  Take 10 mg by mouth once daily.     atorvastatin 40 MG tablet  Commonly known as: LIPITOR  Take 40 mg by mouth.     ketoconazole 2 % cream  Commonly known as: NIZORAL  Apply 2 % topically once daily.     ODEFSEY 200-25-25 mg Tab  Generic drug: yvpbzosoxe-eyiobgwz-enbumw ala  Take 1 tablet by mouth every morning.     OYSTER SHELL CALCIUM-VIT D3 500 mg-5 mcg (200 unit) per tablet  Generic drug: calcium-vitamin D3  Take 1 tablet by mouth 2 (two) times daily.              Indwelling Lines/Drains at time of discharge:   Lines/Drains/Airways       None                   Time spent on the discharge of patient: >45 minutes         Rubia Silveira PA-C  Department of Hospital Medicine  [facility] ([address])

## 2024-01-10 NOTE — PT/OT/SLP PROGRESS
Occupational Therapy   Treatment    Name: Damien Sanchez  MRN: [id number]  Admitting Diagnosis:  Closed fracture of neck of left femur  5 Days Post-Op    Recommendations:     Discharge Recommendations: High Intensity Therapy  Discharge Equipment Recommendations:  bedside commode, bath bench, walker, rolling  Barriers to discharge:  Inaccessible home environment, Decreased caregiver support (current functional level)    Assessment:     Damien Sanchez is a 58 y.o. male with a medical diagnosis of Closed fracture of neck of left femur.  He presents with little hip pain. Performance deficits affecting function are weakness, impaired self care skills, impaired functional mobility, gait instability, impaired balance, decreased ROM, orthopedic precautions, decreased lower extremity function, decreased safety awareness, pain, decreased coordination. Patient with CGA, RW for functional mobility with 1 instance of LOB with turning d/t patient picking up RW; able to regain balance with Min A. Set up/SBA for grooming seated at the sink. Overall, tolerated session well and progressing toward goals.     Prior to onset of impairments pt was independent with mobility and self-care and there is expectation of returning to prior level of function to maintain independence. Pt's clinical condition meets full high intensive therapy criteria. High intensive therapy will provide to total interdisciplinary treatment approach needed. Pt is at high risk of unplanned readmission due to fall risk and inaccessible home. Lower intensive therapy cannot provide total interdisciplinary approach needed.      Rehab Prognosis:  Good; patient would benefit from acute skilled OT services to address these deficits and reach maximum level of function.       Plan:     Patient to be seen 5 x/week to address the above listed problems via self-care/home management, therapeutic activities, therapeutic exercises  Plan of Care Expires: 01/20/24  Plan of Care Reviewed  "with: patient    Subjective     Chief Complaint: "I just don't feel like myself today"  Patient/Family Comments/goals: agreeable to participate in therapy for OT intervention   Pain/Comfort:  Pain Rating 1:  ("A little")  Location - Side 1: Left  Location - Orientation 1: generalized  Location 1: hip  Pain Addressed 1: Reposition, Distraction, Pre-medicate for activity, Cessation of Activity  Pain Rating Post-Intervention 1:  (appears comfortable at rest)    Objective:     Communicated with: RN prior to session.  Patient found supine with peripheral IV upon OT entry to room.    General Precautions: Standard, fall, seizure    Orthopedic Precautions:LLE non weight bearing  Braces: N/A  Respiratory Status: Room air     Occupational Performance:     Bed Mobility:    Supine > Sit: SBA   Sit > Supine: CGA with LLE    Functional Mobility/Transfers:  Sit <> Stand: CGA   Functional Mobility: CGA, RW though with 1 instance of LOB d/t patient lifting RW while turning; able to regain balance with Min A - educated on proper turning with RW for safety awareness and avoid fall risks; pt verbally understood.     Activities of Daily Living:  Grooming: Set up/SBA while seated in front of sink for oral care   LB Dressing: Set Up/SBA to don socks R foot and Max A to don L foot seated EOB       AMPAC 6 Click ADL: 15    Treatment & Education:  OT role, plan of care, progression of goals, importance of continued OOB activity, ADL/functional transfer and mobility retraining, discharge recommendation, call don't fall, safety precautions, fall prevention.     Patient left HOB elevated with all lines intact, call button in reach, and RN notified    GOALS:   Multidisciplinary Problems       Occupational Therapy Goals          Problem: Occupational Therapy    Goal Priority Disciplines Outcome Interventions   Occupational Therapy Goal     OT, PT/OT Ongoing, Progressing    Description: Goals to be met by: 1/20/2024     Patient will increase " functional independence with ADLs by performing:    UE Dressing with Set-up Assistance.  LE Dressing with Contact Guard Assistance.  Grooming while seated at sink with Set-up Assistance.  Toileting from bedside commode with Stand-by Assistance for hygiene and clothing management.   Toilet transfer to bedside commode with Stand-by Assistance.                         Time Tracking:     OT Date of Treatment: 01/10/24  OT Start Time: 0852  OT Stop Time: 0910  OT Total Time (min): 18 min    Billable Minutes:Self Care/Home Management 18 min    OT/NAVI: NAVI     Number of NAVI visits since last OT visit: 3    1/10/2024

## 2024-01-24 ENCOUNTER — OFFICE VISIT (OUTPATIENT)
Dept: PRIMARY CARE CLINIC | Facility: CLINIC | Age: 59
End: 2024-01-24
Payer: MEDICAID

## 2024-01-24 VITALS
SYSTOLIC BLOOD PRESSURE: 120 MMHG | DIASTOLIC BLOOD PRESSURE: 62 MMHG | BODY MASS INDEX: 19.87 KG/M2 | HEART RATE: 74 BPM | WEIGHT: 126.88 LBS

## 2024-01-24 DIAGNOSIS — S72.002D CLOSED FRACTURE OF NECK OF LEFT FEMUR WITH ROUTINE HEALING, SUBSEQUENT ENCOUNTER: Primary | ICD-10-CM

## 2024-01-24 PROCEDURE — 99999 PR PBB SHADOW E&M-EST. PATIENT-LVL III: CPT | Mod: PBBFAC,,, | Performed by: FAMILY MEDICINE

## 2024-01-24 PROCEDURE — 3074F SYST BP LT 130 MM HG: CPT | Mod: CPTII,,, | Performed by: FAMILY MEDICINE

## 2024-01-24 PROCEDURE — 99214 OFFICE O/P EST MOD 30 MIN: CPT | Mod: S$PBB,,, | Performed by: FAMILY MEDICINE

## 2024-01-24 PROCEDURE — 1160F RVW MEDS BY RX/DR IN RCRD: CPT | Mod: CPTII,,, | Performed by: FAMILY MEDICINE

## 2024-01-24 PROCEDURE — 3078F DIAST BP <80 MM HG: CPT | Mod: CPTII,,, | Performed by: FAMILY MEDICINE

## 2024-01-24 PROCEDURE — 3008F BODY MASS INDEX DOCD: CPT | Mod: CPTII,,, | Performed by: FAMILY MEDICINE

## 2024-01-24 PROCEDURE — 99213 OFFICE O/P EST LOW 20 MIN: CPT | Mod: PBBFAC,PN | Performed by: FAMILY MEDICINE

## 2024-01-24 PROCEDURE — 1111F DSCHRG MED/CURRENT MED MERGE: CPT | Mod: CPTII,,, | Performed by: FAMILY MEDICINE

## 2024-01-24 PROCEDURE — 1159F MED LIST DOCD IN RCRD: CPT | Mod: CPTII,,, | Performed by: FAMILY MEDICINE

## 2024-01-24 RX ORDER — HYDROCODONE BITARTRATE AND ACETAMINOPHEN 10; 325 MG/1; MG/1
1 TABLET ORAL EVERY 6 HOURS PRN
Qty: 28 TABLET | Refills: 0 | Status: SHIPPED | OUTPATIENT
Start: 2024-01-24 | End: 2024-01-24 | Stop reason: SDUPTHER

## 2024-01-24 RX ORDER — HYDROCODONE BITARTRATE AND ACETAMINOPHEN 10; 325 MG/1; MG/1
1 TABLET ORAL EVERY 6 HOURS PRN
Qty: 28 TABLET | Refills: 0 | Status: SHIPPED | OUTPATIENT
Start: 2024-01-24 | End: 2024-01-31

## 2024-01-25 NOTE — PROGRESS NOTES
Subjective     Patient ID: Damien Sanchez is a 58 y.o. male.    Chief Complaint: Hospital Follow Up    HPI:  Is a 58-year-old male with a history of HIV, alcohol use disorder, and fibrosis of the liver here for follow-up hospital admission s/p repair of acute close fracture of neck of the left femur on 1/5/24.  Patient doing well postoperatively.  He continues to have some pain in his requesting a refill on his pain medication.  Patient was told that he can not placed weight on his left hip.  He was given a Rollator to assist with ambulation.  Scheduled to follow up with Orthopedics in 1 week.  He has a PCP at [facility].  Review of Systems       Objective     Physical Exam  Constitutional:       Comments: Thin, cachetic male   Musculoskeletal:      Comments: Incision over right hip dry and intact.  No complications.  Steri-Strips in place   Neurological:      Mental Status: He is alert.            Assessment and Plan     1. Closed fracture of neck of left femur with routine healing, subsequent encounter  Patient doing well postoperatively.  Prescription for crutches given to assist with ambulation.  Patient given a 7 day supply for Norco.  -     CRUTCHES FOR HOME USE  -     HYDROcodone-acetaminophen (NORCO)  mg per tablet; Take 1 tablet by mouth every 6 (six) hours as needed for Pain.  Dispense: 28 tablet; Refill: 0         No follow-ups on file.

## 2024-02-01 ENCOUNTER — EXTERNAL HOME HEALTH (OUTPATIENT)
Dept: HOME HEALTH SERVICES | Facility: HOSPITAL | Age: 59
End: 2024-02-01
Payer: MEDICAID

## 2024-02-29 ENCOUNTER — DOCUMENT SCAN (OUTPATIENT)
Dept: HOME HEALTH SERVICES | Facility: HOSPITAL | Age: 59
End: 2024-02-29
Payer: MEDICAID

## 2024-11-21 ENCOUNTER — OCCUPATIONAL HEALTH (OUTPATIENT)
Dept: URGENT CARE | Facility: CLINIC | Age: 59
End: 2024-11-21

## 2024-11-21 DIAGNOSIS — Z23 ENCOUNTER FOR IMMUNIZATION: ICD-10-CM

## 2024-11-21 DIAGNOSIS — Z13.9 ENCOUNTER FOR SCREENING: Primary | ICD-10-CM

## 2025-07-15 ENCOUNTER — PATIENT MESSAGE (OUTPATIENT)
Dept: PRIMARY CARE CLINIC | Facility: CLINIC | Age: 60
End: 2025-07-15
Payer: MEDICAID

## 2025-07-23 ENCOUNTER — HOSPITAL ENCOUNTER (EMERGENCY)
Facility: HOSPITAL | Age: 60
Discharge: HOME | End: 2025-07-23
Attending: EMERGENCY MEDICINE
Payer: MEDICAID

## 2025-07-23 VITALS
TEMPERATURE: 98 F | RESPIRATION RATE: 16 BRPM | HEART RATE: 70 BPM | BODY MASS INDEX: 18.83 KG/M2 | WEIGHT: 120 LBS | HEIGHT: 67 IN | OXYGEN SATURATION: 99 % | SYSTOLIC BLOOD PRESSURE: 152 MMHG | DIASTOLIC BLOOD PRESSURE: 75 MMHG

## 2025-07-23 DIAGNOSIS — M25.531 RIGHT WRIST PAIN: Primary | ICD-10-CM

## 2025-07-23 DIAGNOSIS — T14.90XA TRAUMA: ICD-10-CM

## 2025-07-23 PROCEDURE — 99283 EMERGENCY DEPT VISIT LOW MDM: CPT | Mod: 25

## 2025-07-23 NOTE — ED TRIAGE NOTES
Pt reports right sided wrist pain after fall yesterday afternoon. Pt reports he is having trouble picking stuff up at work this am. Pain 5/10. No meds pta. No deformity noted.

## 2025-07-23 NOTE — Clinical Note
"Damien Sanchez (Greg) was seen and treated in our emergency department on 7/23/2025.  He may return to work on 07/23/2025.       If you have any questions or concerns, please don't hesitate to call.      Shasta Gomez, NP"
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
## (undated) DEVICE — SPONGE COTTON TRAY 4X4IN

## (undated) DEVICE — PAD CAST SPECIALIST STRL 6

## (undated) DEVICE — BIT DRILL 4.3MM

## (undated) DEVICE — STAPLER SKIN ROTATING HEAD

## (undated) DEVICE — DRAPE ORTH SPLIT 77X108IN

## (undated) DEVICE — SUT VICRYL PLUS 0 CT1 18IN

## (undated) DEVICE — SOL IRR SOD CHL .9% POUR

## (undated) DEVICE — Device

## (undated) DEVICE — BANDAGE MATRIX HK LOOP 6IN 5YD

## (undated) DEVICE — UNDERGLOVES BIOGEL PI SIZE 8

## (undated) DEVICE — APPLICATOR CHLORAPREP ORN 26ML

## (undated) DEVICE — POSITIONER IV ARMBOARD FOAM

## (undated) DEVICE — COVER HD BACK TABLE 6FT

## (undated) DEVICE — WIRE GUIDE 3.2MM 400MM
Type: IMPLANTABLE DEVICE | Site: HIP | Status: NON-FUNCTIONAL
Removed: 2024-01-05

## (undated) DEVICE — DRAPE STERI-DRAPE 83X125 IOBAN

## (undated) DEVICE — DRAPE INCISE IOBAN 2 23X17IN

## (undated) DEVICE — VEST O.R. COOLING/WARMING UNIV

## (undated) DEVICE — ELECTRODE REM PLYHSV RETURN 9

## (undated) DEVICE — SUT VICRYL PLUS 2-0 CT1 18

## (undated) DEVICE — GLOVE BIOGEL SKINSENSE PI 7.5

## (undated) DEVICE — BLANKET MAXI-THERM PED 25X33IN

## (undated) DEVICE — DRAPE XRAY EQUIPMENT UNIV

## (undated) DEVICE — DRESSING XEROFORM 1X8IN

## (undated) DEVICE — GLOVE BIOGEL SKINSENSE PI 8.0

## (undated) DEVICE — GOWN ECLIPSE REINF LV4 XLNG XL